# Patient Record
Sex: MALE | Race: WHITE
[De-identification: names, ages, dates, MRNs, and addresses within clinical notes are randomized per-mention and may not be internally consistent; named-entity substitution may affect disease eponyms.]

---

## 2017-05-11 ENCOUNTER — HOSPITAL ENCOUNTER (OUTPATIENT)
Dept: HOSPITAL 76 - DI | Age: 82
Discharge: HOME | End: 2017-05-11
Attending: FAMILY MEDICINE
Payer: MEDICARE

## 2017-05-11 DIAGNOSIS — R91.8: Primary | ICD-10-CM

## 2017-05-11 PROCEDURE — 71250 CT THORAX DX C-: CPT

## 2017-05-11 NOTE — CT REPORT
CT CHEST WITHOUT CONTRAST:  05/11/2017

 

CLINICAL INDICATION:  Possible new mediastinal mass on chest x-ray of 05/04/
2017.

 

TECHNIQUE:  Axial CT images of the chest were obtained without contrast.

 

In accordance with CT protocol optimization, one or more of the following dose 
reduction techniques were utilized for this exam:  automated exposure control, 
adjustment of mA and/or KV based on patient size, or use of iterative 
reconstructive technique.

 

COMPARISON:  Chest x-ray 05/04/2017. 

 

FINDINGS:  The heart and great vessels demonstrate atherosclerotic 
calcification.  No hilar or mediastinal lymphadenopathy is appreciated.  The 
abnormality on plain film correlates with prominent right hilar vasculature.  
No parenchymal mass is seen in the lung.  There are several small noncalcified 
pulmonary nodules present, predominantly subpleural in location, measuring up 
to 6 mm in diameter.  No effusion or pneumothorax is present.  Limited 
evaluation of upper abdominal structures demonstrates normal adrenal glands.  
Multiple renal cysts are noted.  Osseous structures demonstrate degenerative 
changes.

 

IMPRESSION:  

1.  PLAIN FILM ABNORMALITY CORRELATES WITH PROMINENT RIGHT HILAR VASCULAR 
STRUCTURES.  NO HILAR ADENOPATHY OR PARENCHYMAL LUNG MASS IS IDENTIFIED.

 

2.  MULTIPLE SMALL NONCALCIFIED SUBPLEURAL NODULES BILATERALLY, MEASURING UP TO 
6 MM IN DIAMETER.  PER [employer] GUIDELINES, IF THE PATIENT IS LOW 
RISK FOR LUNG CANCER, FOLLOWUP SCAN IN 12 MONTHS IS RECOMMENDED; IF THE PATIENT 
IS HIGH RISK, FOLLOWUP IN 6-12, THEN 18-24 MONTHS WOULD BE RECOMMENDED. 

 

 

 

DD:05/11/2017 12:05:41  DT: 05/11/2017 12:12  JOB #: [id number]  EXT JOB #:
[id number]

[email or web address]

## 2017-11-21 ENCOUNTER — HOSPITAL ENCOUNTER (OUTPATIENT)
Dept: HOSPITAL 76 - DI | Age: 82
Discharge: HOME | End: 2017-11-21
Attending: PSYCHIATRY & NEUROLOGY
Payer: MEDICARE

## 2017-11-21 DIAGNOSIS — G20: Primary | ICD-10-CM

## 2017-11-21 PROCEDURE — 70551 MRI BRAIN STEM W/O DYE: CPT

## 2017-11-21 NOTE — MRI REPORT
EXAM:

MRI BRAIN WITHOUT CONTRAST

 

EXAM DATE: 11/21/2017 10:55 AM.

 

CLINICAL HISTORY: PARKINSON DISEASE.

 

COMPARISON: None.

 

TECHNIQUE: Multiplanar, multisequence T1-weighted and fluid-sensitive MR sequences of the brain were 
performed. Sequences optimized for routine evaluation. Other: None. IV Contrast: None.

 

FINDINGS:

The study is somewhat limited due to suboptimal coil being used, as well as patient motion..

 

Brain Volume: Moderate diffuse cerebral and cerebellar volume loss with ex vacuo dilatation of the ve
ntricles and sulci, slightly advanced for age.

 

Parenchyma/Dura: No mass, acute infarct or hemorrhage. Scattered T2/FLAIR hyperintense periventricula
r, deep, and subcortical white matter lesions in cerebral hemispheres bilaterally. No parenchymal earlene
rohemorrhages.

 

Ventricles/Cisterns: Moderate ex vacuo dilatation. 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

 

Orbits: Status post bilateral lens replacement surgery. The visualized orbits are otherwise unremarka
ble.

 

Sella Turcica: The pituitary gland, cavernous sinuses, suprasellar cistern and optic chiasm are unrem
arkable.

 

IAC: Symmetric and unremarkable.

 

Vasculature: Normal signal flow void is seen in the major arterial structures at the skull base.

 

Sinuses: Complete opacification of the right maxillary sinus. Moderate mucosal thickening left maxill
zahra sinus.

 

Bones: No focal pathologic appearing marrow signal changes.

 

Other: None.

 

 

IMPRESSION: 

1. No evidence of acute intracranial abnormality, specifically no evidence of acute or subacute infar
ct, intracranial hemorrhage, mass effect, midline shift, or hydrocephalus. The study is somewhat limi
mani due to suboptimal coil being used, as well as patient motion.

 

2. Moderate diffuse cerebral and cerebellar volume loss with ex vacuo dilatation of the ventricles an
d sulci, slightly advanced for age.

 

3. Scattered T2/FLAIR hyperintense periventricular, deep, and subcortical white matter lesions in cer
ebral hemispheres bilaterally. While nonspecific, these are favored to represent sequela of chronic m
icroangiopathy.

 

4. Complete opacification of the right maxillary sinus. Moderate mucosal thickening left maxillary si
nus. Recommend clinical correlation for symptoms of sinusitis.

 

[facility]

Referring Provider Line: [phone]

 

SITE ID: [id number]

## 2018-06-19 ENCOUNTER — HOSPITAL ENCOUNTER (OUTPATIENT)
Dept: HOSPITAL 76 - LAB.WCP | Age: 83
Discharge: HOME | End: 2018-06-19
Attending: FAMILY MEDICINE
Payer: MEDICARE

## 2018-06-19 DIAGNOSIS — I12.9: ICD-10-CM

## 2018-06-19 DIAGNOSIS — R73.01: Primary | ICD-10-CM

## 2018-06-19 DIAGNOSIS — N18.3: ICD-10-CM

## 2018-06-19 DIAGNOSIS — I42.9: ICD-10-CM

## 2018-06-19 DIAGNOSIS — R06.09: ICD-10-CM

## 2018-06-19 DIAGNOSIS — I25.10: ICD-10-CM

## 2018-06-19 LAB
ALBUMIN DIAFP-MCNC: 3.9 G/DL (ref 3.2–5.5)
ALBUMIN/GLOB SERPL: 1.2 {RATIO} (ref 1–2.2)
ALP SERPL-CCNC: 63 IU/L (ref 42–121)
ALT SERPL W P-5'-P-CCNC: < 10 IU/L (ref 10–60)
ANION GAP SERPL CALCULATED.4IONS-SCNC: 10 MMOL/L (ref 6–13)
AST SERPL W P-5'-P-CCNC: 24 IU/L (ref 10–42)
BASOPHILS NFR BLD AUTO: 0 10^3/UL (ref 0–0.1)
BASOPHILS NFR BLD AUTO: 0.5 %
BILIRUB BLD-MCNC: 1.1 MG/DL (ref 0.2–1)
BUN SERPL-MCNC: 31 MG/DL (ref 6–20)
CALCIUM UR-MCNC: 9 MG/DL (ref 8.5–10.3)
CHLORIDE SERPL-SCNC: 105 MMOL/L (ref 101–111)
CHOLEST SERPL-MCNC: 116 MG/DL
CO2 SERPL-SCNC: 23 MMOL/L (ref 21–32)
CREAT SERPLBLD-SCNC: 1 MG/DL (ref 0.6–1.2)
EOSINOPHIL # BLD AUTO: 0.1 10^3/UL (ref 0–0.7)
EOSINOPHIL NFR BLD AUTO: 1.6 %
ERYTHROCYTE [DISTWIDTH] IN BLOOD BY AUTOMATED COUNT: 14.7 % (ref 12–15)
EST. AVERAGE GLUCOSE BLD GHB EST-MCNC: 103 MG/DL (ref 70–100)
GFRSERPLBLD MDRD-ARVRAT: 71 ML/MIN/{1.73_M2} (ref 89–?)
GLOBULIN SER-MCNC: 3.2 G/DL (ref 2.1–4.2)
GLUCOSE SERPL-MCNC: 95 MG/DL (ref 70–100)
HB2 TOTAL: 15.8 G/DL
HBA1C BLD-MCNC: 0.53 G/DL
HDLC SERPL-MCNC: 49 MG/DL
HDLC SERPL: 2.4 {RATIO} (ref ?–5)
HEMOGLOBIN A1C %: 5.2 % (ref 4.6–6.2)
HGB UR QL STRIP: 14.3 G/DL (ref 14–18)
LDLC SERPL CALC-MCNC: 56 MG/DL
LDLC/HDLC SERPL: 1.1 {RATIO} (ref ?–3.6)
LYMPHOCYTES # SPEC AUTO: 1.1 10^3/UL (ref 1.5–3.5)
LYMPHOCYTES NFR BLD AUTO: 18 %
MCH RBC QN AUTO: 32.9 PG (ref 27–31)
MCHC RBC AUTO-ENTMCNC: 34.1 G/DL (ref 32–36)
MCV RBC AUTO: 96.5 FL (ref 80–94)
MONOCYTES # BLD AUTO: 0.4 10^3/UL (ref 0–1)
MONOCYTES NFR BLD AUTO: 7.1 %
NEUTROPHILS # BLD AUTO: 4.4 10^3/UL (ref 1.5–6.6)
NEUTROPHILS # SNV AUTO: 6.1 X10^3/UL (ref 4.8–10.8)
NEUTROPHILS NFR BLD AUTO: 72.8 %
PDW BLD AUTO: 9 FL (ref 7.4–11.4)
PLATELET # BLD: 137 10^3/UL (ref 130–450)
PROT SPEC-MCNC: 7.1 G/DL (ref 6.7–8.2)
RBC MAR: 4.35 10^6/UL (ref 4.7–6.1)
SODIUM SERPLBLD-SCNC: 138 MMOL/L (ref 135–145)
VLDLC SERPL-SCNC: 11 MG/DL

## 2018-06-19 PROCEDURE — 80053 COMPREHEN METABOLIC PANEL: CPT

## 2018-06-19 PROCEDURE — 83880 ASSAY OF NATRIURETIC PEPTIDE: CPT

## 2018-06-19 PROCEDURE — 83721 ASSAY OF BLOOD LIPOPROTEIN: CPT

## 2018-06-19 PROCEDURE — 84443 ASSAY THYROID STIM HORMONE: CPT

## 2018-06-19 PROCEDURE — 83036 HEMOGLOBIN GLYCOSYLATED A1C: CPT

## 2018-06-19 PROCEDURE — 85025 COMPLETE CBC W/AUTO DIFF WBC: CPT

## 2018-06-19 PROCEDURE — 36415 COLL VENOUS BLD VENIPUNCTURE: CPT

## 2018-06-19 PROCEDURE — 80061 LIPID PANEL: CPT

## 2018-10-29 ENCOUNTER — HOSPITAL ENCOUNTER (OUTPATIENT)
Dept: HOSPITAL 76 - SC | Age: 83
Discharge: HOME | End: 2018-10-29
Attending: INTERNAL MEDICINE
Payer: MEDICARE

## 2018-10-29 DIAGNOSIS — G47.10: Primary | ICD-10-CM

## 2018-10-29 PROCEDURE — 99212 OFFICE O/P EST SF 10 MIN: CPT

## 2018-10-29 PROCEDURE — 99203 OFFICE O/P NEW LOW 30 MIN: CPT

## 2019-02-19 ENCOUNTER — HOSPITAL ENCOUNTER (OUTPATIENT)
Dept: HOSPITAL 76 - DI | Age: 84
Discharge: HOME | End: 2019-02-19
Attending: PODIATRIST
Payer: MEDICARE

## 2019-02-19 ENCOUNTER — HOSPITAL ENCOUNTER (OUTPATIENT)
Dept: HOSPITAL 76 - LAB.WCP | Age: 84
Discharge: HOME | End: 2019-02-19
Attending: FAMILY MEDICINE
Payer: MEDICARE

## 2019-02-19 DIAGNOSIS — I25.10: ICD-10-CM

## 2019-02-19 DIAGNOSIS — M77.32: ICD-10-CM

## 2019-02-19 DIAGNOSIS — N18.3: Primary | ICD-10-CM

## 2019-02-19 DIAGNOSIS — M19.072: Primary | ICD-10-CM

## 2019-02-19 DIAGNOSIS — R41.82: ICD-10-CM

## 2019-02-19 DIAGNOSIS — G20: ICD-10-CM

## 2019-02-19 DIAGNOSIS — N18.3: ICD-10-CM

## 2019-02-19 LAB
ALBUMIN DIAFP-MCNC: 4.1 G/DL (ref 3.2–5.5)
ALBUMIN/GLOB SERPL: 1.4 {RATIO} (ref 1–2.2)
ALP SERPL-CCNC: 76 IU/L (ref 42–121)
ALT SERPL W P-5'-P-CCNC: < 10 IU/L (ref 10–60)
ANION GAP SERPL CALCULATED.4IONS-SCNC: 9 MMOL/L (ref 6–13)
AST SERPL W P-5'-P-CCNC: 27 IU/L (ref 10–42)
BASOPHILS NFR BLD AUTO: 0.5 10^3/UL (ref 0–0.1)
BASOPHILS NFR BLD AUTO: 7 %
BILIRUB BLD-MCNC: 1.2 MG/DL (ref 0.2–1)
BUN SERPL-MCNC: 29 MG/DL (ref 6–20)
CALCIUM UR-MCNC: 8.9 MG/DL (ref 8.5–10.3)
CHLORIDE SERPL-SCNC: 100 MMOL/L (ref 101–111)
CHOLEST SERPL-MCNC: 111 MG/DL
CO2 SERPL-SCNC: 25 MMOL/L (ref 21–32)
CREAT SERPLBLD-SCNC: 1.1 MG/DL (ref 0.6–1.2)
EOSINOPHIL # BLD AUTO: 0.2 10^3/UL (ref 0–0.7)
EOSINOPHIL NFR BLD AUTO: 2.5 %
ERYTHROCYTE [DISTWIDTH] IN BLOOD BY AUTOMATED COUNT: 16.4 % (ref 12–15)
GFRSERPLBLD MDRD-ARVRAT: 64 ML/MIN/{1.73_M2} (ref 89–?)
GLOBULIN SER-MCNC: 3 G/DL (ref 2.1–4.2)
GLUCOSE SERPL-MCNC: 91 MG/DL (ref 70–100)
HDLC SERPL-MCNC: 48 MG/DL
HDLC SERPL: 2.3 {RATIO} (ref ?–5)
HGB UR QL STRIP: 14.3 G/DL (ref 14–18)
LDLC SERPL CALC-MCNC: 53 MG/DL
LDLC/HDLC SERPL: 1.1 {RATIO} (ref ?–3.6)
LYMPHOCYTES # SPEC AUTO: 0.4 10^3/UL (ref 1.5–3.5)
LYMPHOCYTES NFR BLD AUTO: 5.5 %
MCH RBC QN AUTO: 32.6 PG (ref 27–31)
MCHC RBC AUTO-ENTMCNC: 30.8 G/DL (ref 32–36)
MCV RBC AUTO: 105.8 FL (ref 80–94)
MONOCYTES # BLD AUTO: 0.5 10^3/UL (ref 0–1)
MONOCYTES NFR BLD AUTO: 7.6 %
NEUTROPHILS # BLD AUTO: 5.1 10^3/UL (ref 1.5–6.6)
NEUTROPHILS # SNV AUTO: 6.6 X10^3/UL (ref 4.8–10.8)
NEUTROPHILS NFR BLD AUTO: 77.4 %
PDW BLD AUTO: 10.2 FL (ref 7.4–11.4)
PLATELET # BLD: 133 10^3/UL (ref 130–450)
PROT SPEC-MCNC: 7.1 G/DL (ref 6.7–8.2)
RBC MAR: 4.39 10^6/UL (ref 4.7–6.1)
SODIUM SERPLBLD-SCNC: 134 MMOL/L (ref 135–145)
TSH SERPL-ACNC: 2.3 UIU/ML (ref 0.34–5.6)
VIT B12 SERPL-MCNC: 369 PG/ML (ref 180–914)
VLDLC SERPL-SCNC: 10 MG/DL

## 2019-02-19 PROCEDURE — 85025 COMPLETE CBC W/AUTO DIFF WBC: CPT

## 2019-02-19 PROCEDURE — 82607 VITAMIN B-12: CPT

## 2019-02-19 PROCEDURE — 83721 ASSAY OF BLOOD LIPOPROTEIN: CPT

## 2019-02-19 PROCEDURE — 80061 LIPID PANEL: CPT

## 2019-02-19 PROCEDURE — 80053 COMPREHEN METABOLIC PANEL: CPT

## 2019-02-19 PROCEDURE — 36415 COLL VENOUS BLD VENIPUNCTURE: CPT

## 2019-02-19 PROCEDURE — 84443 ASSAY THYROID STIM HORMONE: CPT

## 2019-02-19 NOTE — XRAY REPORT
Reason:  L FOOT PAIN X 2 YRS

Procedure Date:  02/19/2019   

Accession Number:  [id number] / [id number]                    

Procedure:  XR  - Foot 3 View LT CPT Code:  

 

FULL RESULT:

 

 

EXAM:

LEFT FOOT RADIOGRAPHY

 

EXAM DATE: 2/19/2019 10:55 AM.

 

CLINICAL HISTORY: L FOOT PAIN X 2 YRS.

 

COMPARISON: None.

 

TECHNIQUE: 3 views.

 

FINDINGS: PIPs in flexion

Bones: Plantar heel spur. Achilles tendon heel spur No fractures or bone 

lesions.

 

Joints: First metatarsophalangeal joint space ossified joint space 

narrowing.

 

Soft Tissues:  Soft tissue swelling.

IMPRESSION:

1. DJD

2. Heel spurs

 

[facility]

## 2019-07-20 ENCOUNTER — HOSPITAL ENCOUNTER (EMERGENCY)
Dept: HOSPITAL 76 - ED | Age: 84
Discharge: HOME | End: 2019-07-20
Payer: MEDICARE

## 2019-07-20 VITALS — DIASTOLIC BLOOD PRESSURE: 91 MMHG | SYSTOLIC BLOOD PRESSURE: 194 MMHG
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
## 2019-07-20 DIAGNOSIS — S22.41XA: Primary | ICD-10-CM

## 2019-07-20 DIAGNOSIS — I10: ICD-10-CM
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
## 2019-07-20 DIAGNOSIS — G20: ICD-10-CM

## 2019-07-20 PROCEDURE — 99283 EMERGENCY DEPT VISIT LOW MDM: CPT

## 2019-07-20 PROCEDURE — 71101 X-RAY EXAM UNILAT RIBS/CHEST: CPT

## 2019-07-20 PROCEDURE — 99284 EMERGENCY DEPT VISIT MOD MDM: CPT

## 2019-07-20 NOTE — ED PHYSICIAN DOCUMENTATION
History of Present Illness





- Stated complaint


Stated Complaint: GLF/RT SIDE RIB PX





- Chief complaint


Chief Complaint: General





- History obtained from


History obtained from: Patient, Family





- History of Present Illness


Timing: Prior to arrival





- Additonal information


Additional information: 





Patient is an 84-year-old male with history of Parkinson's, hyperlipidemia, 

hypertension with only anticoagulation of baby aspirin presenting with his wife 

after mechanical fall just prior to arrival.  Wife reports that she had the 

patient will try to lift something into the back of the car when he miss stepped

and fell forward onto his knees.  Patient was able to catch himself on the 

bumper of the car.  He did not strike his head or lose consciousness.  Patient 

does report superficial abrasions to his right knee without other extremity 

pain, sensation change, strength or range of motion change.  Patient also denies

neck pain, back pain, headache, chest pain except for rib pain, abdominal pain, 

nausea, vomiting, urinary or stool changes.  Patient does report right-sided rib

pain that hurts more with movement and breathing.  No other improving or 

worsening factors noted. 





Review of Systems


Cardiac: reports: Other (Rib pain)


Respiratory: reports: Dyspnea


GI: denies: Abdominal Pain, Nausea, Vomiting, Constipation, Diarrhea


: denies: Dysuria


Skin: reports: Abrasion (s)


Musculoskeletal: denies: Neck pain, Back pain





PD PAST MEDICAL HISTORY





- Past Medical History


Cardiovascular: Hypertension, High cholesterol, Coronary artery disease, MI


Respiratory: Asthma, COPD


Endocrine/Autoimmune: None


GI: GERD, Colon polyps


: Frequency, Kidney stones, Other


HEENT: Glaucoma, Chronic sinusitis


Psych: None


Musculoskeletal: None


Derm: Psoriasis





- Past Surgical History


Past Surgical History: Yes


General: Colonoscopy


Cardiovascular: Coronary stent


HEENT: Cataracts, Other





- Present Medications


Home Medications: 


                                Ambulatory Orders











 Medication  Instructions  Recorded  Confirmed


 


Aspirin 81 mg PO DAILY 12/18/13 02/22/16


 


Fluticasone/Salmeterol [Advair 1 puffs INH BID 12/18/13 02/22/16





500-50 Diskus]   


 


Travoprost [Travatan Z] 5 ml OP QPM 12/18/13 02/22/16


 


Docusate Sodium [Stool Softener] 250 mg PO DAILY 02/22/16 02/22/16


 


Hydrochlorothiazide 25 mg PO DAILY 02/22/16 02/22/16


 


Amiodarone [Pacerone] 200 mg PO DAILY 05/03/16 05/03/16


 


Atorvastatin [Lipitor] 20 mg PO DAILY 05/03/16 05/03/16


 


Clopidogrel [Plavix] 75 05/03/16 


 


Metoprolol Tartrate 6.25 mg PO DAILY 05/03/16 05/03/16


 


Albuterol Sulfate [Proair Hfa 8.5 gm IH QID #1 hfa.aer.ad 06/11/16 





Inhaler]   


 


Furosemide [Lasix] 20 mg 06/11/16 


 


Furosemide [Lasix] 20 mg PO DAILY #60 tablet 06/11/16 


 


dexAMETHasone [Decadron] 4 mg PO DAILY #5 tablet 06/11/16 


 


Diazepam [Valium] 2 mg PO Q6HR #10 tablet 07/20/19 


 


Hydrocodone/Acetaminophen 1 each PO Q6H PRN #14 tablet 07/20/19 





[Hydrocodon-Acetaminophen 5-325]   














- Allergies


Allergies/Adverse Reactions: 


                                    Allergies











Allergy/AdvReac Type Severity Reaction Status Date / Time


 


No Known Drug Allergies Allergy   Verified 07/20/19 18:46














- Social History


Does the pt smoke?: No


Smoking Status: Never smoker


Does the pt drink ETOH?: Yes


Does the pt have substance abuse?: No





- Immunizations


Immunizations are current?: Yes





- POLST


Patient has POLST: No





PD ED PE NORMAL





- Vitals


Vital signs reviewed: Yes





- General


General: Alert and oriented X 3, No acute distress, Well developed/nourished





- HEENT


HEENT: Atraumatic, Moist mucous membranes





- Neck


Neck: No bony TTP





- Cardiac


Cardiac: RRR, No murmur, Other (Diffuse right-sided rib discomfort with 

palpation)





- Respiratory


Respiratory: No respiratory distress, Clear bilaterally





- Abdomen


Abdomen: Normal bowel sounds, Soft, Non tender, Non distended





- Derm


Derm: Normal color, Warm and dry, No rash, Other (Except for superficial 

abrasions over right knee otherwise uncomplicated)





- Extremities


Extremities: No deformity, No tenderness to palpate





- Neuro


Neuro: Alert and oriented X 3, No motor deficit, No sensory deficit





- Psych


Psych: Normal mood, Normal affect





Results





- Vitals


Vitals: 


                               Vital Signs - 24 hr











  07/20/19 07/20/19





  18:44 19:24


 


Temperature 36.8 C 36.4 C L


 


Heart Rate 72 70


 


Respiratory 19 18





Rate  


 


Blood Pressure 192/79 H 192/91 H


 


O2 Saturation 100 98








                                     Oxygen











O2 Source                      Room air

















PD MEDICAL DECISION MAKING





- ED course


Complexity details: reviewed results, re-evaluated patient, considered 

differential, d/w patient, d/w family


ED course: 





Patient presenting with mechanical fall just prior to arrival and significant 

right-sided rib pain.  Patient does have superficial abrasions to lower 

extremities that will not require further treatment.  Patient denies other 

injuries and have low suspicion for closed head injury, concussion, vertebral 

spinal cord injury, other chest trauma, abdominal trauma, or other extremity 

injury.  Further evaluation limited to chest and ribs.  Plain films found 

evidence of rib fractures on the right of third, fifth, sixth, seventh ribs.  No

other pneumothorax, hemothorax or other complications noted.  Discussed results 

and recommendations with patient and wife including use of incentive spirometer,

prescription medications, alternatives to prescription medications, return 

precautions, appropriate follow-up.  Patient wife voiced understanding and are 

comfortable discharge plan.





Departure





- Departure


Disposition: 01 Home, Self Care


Clinical Impression: 


Ribs, multiple fractures


Qualifiers:


 Encounter type: initial encounter Fracture type: closed Laterality: right 

Qualified Code(s): S22.41XA - Multiple fractures of ribs, right side, initial 

encounter for closed fracture





Condition: Good


Instructions:  ED Fx Rib


Follow-Up: 


Teddy Talley MD [Primary Care Provider] - Within 3 Days


Prescriptions: 


Diazepam [Valium] 2 mg PO Q6HR #10 tablet


Hydrocodone/Acetaminophen [Hydrocodon-Acetaminophen 5-325] 1 each PO Q6H PRN #14

tablet


 PRN Reason: pain


Comments: 


Please use incentive spirometer as instructed to avoid pneumonia.  Recommend 

heat application to reduce muscle spasm, as well as stretching and mild activity

to help avoid infection and other complication.  However, please do not perform 

heavy lifting or strenuous exercise to worsen pain.  May use ibuprofen/Tylenol 

for pain control, but if you need something stronger, please take Norco and Christy

ium for pain and muscle spasm, respectively.  If taking these medications do not

combine with Tylenol, alcohol, driving.  If taking Norco regularly, please take 

stool softener or laxative to avoid constipation.  Please follow-up with primary

care physician next to 3 days and return to ED sooner if experience worsening 

symptoms or have other concerns.

## 2019-07-20 NOTE — XRAY REPORT
Reason:  fell forward onto knees with right rib pain

Procedure Date:  07/20/2019   

Accession Number:  [id number] / [id number]                    

Procedure:  XR  - Ribs w/PA Chest RT CPT Code:  

 

FULL RESULT:

 

 

EXAM:

RIGHT RIB RADIOGRAPHY

 

EXAM DATE: 7/20/2019 07:21 PM.

 

CLINICAL HISTORY: Fell forward onto knees, with right rib pain.

 

COMPARISON: None.

 

TECHNIQUE: 1 view of the chest and 4 views of the ribs.

 

FINDINGS:

Bones: Fractures of the third and of the fifth through seventh ribs.

 

Lungs: Right base atelectasis. No pneumothorax.

 

Mediastinum: Heart and mediastinal contours are unremarkable.

 

Other: None.

IMPRESSION: Fractures of the right third and fifth through seventh ribs 

with right base atelectasis.

 

[facility]

## 2019-08-12 ENCOUNTER — HOSPITAL ENCOUNTER (OUTPATIENT)
Dept: HOSPITAL 76 - DI | Age: 84
Discharge: HOME | End: 2019-08-12
Attending: PHYSICIAN ASSISTANT
Payer: MEDICARE

## 2019-08-12 DIAGNOSIS — J20.9: Primary | ICD-10-CM

## 2019-08-12 DIAGNOSIS — S22.41XD: ICD-10-CM

## 2019-08-12 PROCEDURE — 71046 X-RAY EXAM CHEST 2 VIEWS: CPT

## 2019-08-14 NOTE — XRAY REPORT
Reason:  BRONCHITIS,ACUTE

Procedure Date:  08/12/2019   

Accession Number:  [id number] / [id number]                    

Procedure:  XR  - Chest 2 View X-Ray CPT Code:  33376

 

FULL RESULT:

 

 

EXAM:

CHEST RADIOGRAPHY

 

EXAM DATE: 8/12/2019 05:14 PM.

 

CLINICAL HISTORY: Bronchitis, acute.

 

COMPARISON: RIBS W/PA CHEST RT 07/20/2019 7:21 PM

CHEST W/O 05/11/2017 10:26 AM

CHEST 2 VIEW PA/LAT 05/04/2017 4:02 PM.

 

TECHNIQUE: 2 views.

 

FINDINGS:

Lungs/Pleura: Hyperinflation. No consolidation. No vascular congestion. 

No pneumothorax. No pleural effusions.

 

Mediastinum: Heart size upper normal. Aorta is tortuous.

 

Other: Degenerative changes of the thoracic spine. Healing right rib 

fractures noted.

IMPRESSION:

1. Hyperinflation.

2. No acute disease.

3. Healing right rib fractures.

 

[facility]

## 2019-10-04 ENCOUNTER — HOSPITAL ENCOUNTER (OUTPATIENT)
Dept: HOSPITAL 76 - EMS | Age: 84
End: 2019-10-04
Attending: SURGERY
Payer: MEDICARE

## 2019-10-04 DIAGNOSIS — Z03.89: Primary | ICD-10-CM

## 2019-10-05 ENCOUNTER — HOSPITAL ENCOUNTER (EMERGENCY)
Dept: HOSPITAL 76 - ED | Age: 84
Discharge: HOME | End: 2019-10-05
Payer: MEDICARE

## 2019-10-05 ENCOUNTER — HOSPITAL ENCOUNTER (OUTPATIENT)
Dept: HOSPITAL 76 - EMS | Age: 84
Discharge: ANOTHER HOSPITAL | End: 2019-10-05
Attending: SURGERY
Payer: MEDICARE

## 2019-10-05 VITALS — DIASTOLIC BLOOD PRESSURE: 79 MMHG | SYSTOLIC BLOOD PRESSURE: 179 MMHG

## 2019-10-05 DIAGNOSIS — M79.10: Primary | ICD-10-CM

## 2019-10-05 DIAGNOSIS — I25.2: ICD-10-CM

## 2019-10-05 DIAGNOSIS — Z79.02: ICD-10-CM
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
## 2019-10-05 DIAGNOSIS — R26.2: ICD-10-CM

## 2019-10-05 DIAGNOSIS — M54.9: Primary | ICD-10-CM

## 2019-10-05 DIAGNOSIS — M25.551: ICD-10-CM

## 2019-10-05 DIAGNOSIS — Z95.5: ICD-10-CM

## 2019-10-05 DIAGNOSIS — G20: ICD-10-CM
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
## 2019-10-05 DIAGNOSIS — Z79.82: ICD-10-CM

## 2019-10-05 DIAGNOSIS — I25.10: ICD-10-CM

## 2019-10-05 DIAGNOSIS — I10: ICD-10-CM

## 2019-10-05 LAB
ALBUMIN DIAFP-MCNC: 4 G/DL (ref 3.2–5.5)
ALBUMIN/GLOB SERPL: 1.3 {RATIO} (ref 1–2.2)
ALP SERPL-CCNC: 80 IU/L (ref 42–121)
ALT SERPL W P-5'-P-CCNC: 11 IU/L (ref 10–60)
ANION GAP SERPL CALCULATED.4IONS-SCNC: 10 MMOL/L (ref 6–13)
AST SERPL W P-5'-P-CCNC: 35 IU/L (ref 10–42)
BASOPHILS NFR BLD AUTO: 0.1 10^3/UL (ref 0–0.1)
BASOPHILS NFR BLD AUTO: 0.6 %
BILIRUB BLD-MCNC: 1.2 MG/DL (ref 0.2–1)
BUN SERPL-MCNC: 28 MG/DL (ref 6–20)
CALCIUM UR-MCNC: 8.9 MG/DL (ref 8.5–10.3)
CHLORIDE SERPL-SCNC: 106 MMOL/L (ref 101–111)
CLARITY UR REFRACT.AUTO: CLEAR
CO2 SERPL-SCNC: 26 MMOL/L (ref 21–32)
CREAT SERPLBLD-SCNC: 1.2 MG/DL (ref 0.6–1.2)
EOSINOPHIL # BLD AUTO: 0.3 10^3/UL (ref 0–0.7)
EOSINOPHIL NFR BLD AUTO: 3.8 %
ERYTHROCYTE [DISTWIDTH] IN BLOOD BY AUTOMATED COUNT: 13.8 % (ref 12–15)
GFRSERPLBLD MDRD-ARVRAT: 58 ML/MIN/{1.73_M2} (ref 89–?)
GLOBULIN SER-MCNC: 3.2 G/DL (ref 2.1–4.2)
GLUCOSE SERPL-MCNC: 114 MG/DL (ref 70–100)
GLUCOSE UR QL STRIP.AUTO: NEGATIVE MG/DL
HGB UR QL STRIP: 14.2 G/DL (ref 14–18)
INR PPP: 1.2 (ref 0.8–1.2)
KETONES UR QL STRIP.AUTO: NEGATIVE MG/DL
LIPASE SERPL-CCNC: 31 U/L (ref 22–51)
LYMPHOCYTES # SPEC AUTO: 1 10^3/UL (ref 1.5–3.5)
LYMPHOCYTES NFR BLD AUTO: 11.8 %
MCH RBC QN AUTO: 33.6 PG (ref 27–31)
MCHC RBC AUTO-ENTMCNC: 33.3 G/DL (ref 32–36)
MCV RBC AUTO: 100.9 FL (ref 80–94)
MONOCYTES # BLD AUTO: 1 10^3/UL (ref 0–1)
MONOCYTES NFR BLD AUTO: 11.6 %
NEUTROPHILS # BLD AUTO: 5.9 10^3/UL (ref 1.5–6.6)
NEUTROPHILS # SNV AUTO: 8.2 X10^3/UL (ref 4.8–10.8)
NEUTROPHILS NFR BLD AUTO: 71.8 %
NITRITE UR QL STRIP.AUTO: NEGATIVE
PDW BLD AUTO: 10.5 FL (ref 7.4–11.4)
PH UR STRIP.AUTO: 5.5 PH (ref 5–7.5)
PLATELET # BLD: 132 10^3/UL (ref 130–450)
PROT SPEC-MCNC: 7.2 G/DL (ref 6.7–8.2)
PROT UR STRIP.AUTO-MCNC: NEGATIVE MG/DL
PROTHROM ACT/NOR PPP: 13.7 SECS (ref 9.9–12.6)
RBC # UR STRIP.AUTO: NEGATIVE /UL
RBC MAR: 4.22 10^6/UL (ref 4.7–6.1)
SODIUM SERPLBLD-SCNC: 142 MMOL/L (ref 135–145)
SP GR UR STRIP.AUTO: 1.02 (ref 1–1.03)
UROBILINOGEN UR QL STRIP.AUTO: (no result) E.U./DL
UROBILINOGEN UR STRIP.AUTO-MCNC: NEGATIVE MG/DL

## 2019-10-05 PROCEDURE — 81003 URINALYSIS AUTO W/O SCOPE: CPT

## 2019-10-05 PROCEDURE — 80053 COMPREHEN METABOLIC PANEL: CPT

## 2019-10-05 PROCEDURE — 36415 COLL VENOUS BLD VENIPUNCTURE: CPT

## 2019-10-05 PROCEDURE — 99283 EMERGENCY DEPT VISIT LOW MDM: CPT

## 2019-10-05 PROCEDURE — 85610 PROTHROMBIN TIME: CPT

## 2019-10-05 PROCEDURE — 99284 EMERGENCY DEPT VISIT MOD MDM: CPT

## 2019-10-05 PROCEDURE — 81001 URINALYSIS AUTO W/SCOPE: CPT

## 2019-10-05 PROCEDURE — 71045 X-RAY EXAM CHEST 1 VIEW: CPT

## 2019-10-05 PROCEDURE — 87086 URINE CULTURE/COLONY COUNT: CPT

## 2019-10-05 PROCEDURE — 85025 COMPLETE CBC W/AUTO DIFF WBC: CPT

## 2019-10-05 PROCEDURE — 83690 ASSAY OF LIPASE: CPT

## 2019-10-05 NOTE — ED PHYSICIAN DOCUMENTATION
History of Present Illness





- Stated complaint


Stated Complaint: GLF





- Chief complaint


Chief Complaint: General





- Additonal information


Additional information: 





This is an 85-year-old male with a history of coronary artery disease, 

Parkinson's, who presents with some pain and difficulty walking after fall.  

History is provided by patient and his wife who is at bedside.  Patient was 

walking 2 days ago in a parking lot and he tripped or slipped and fell landing 

backwards on his right side.  He states he may have hit his head but not in a 

significant way and he denies loss of consciousness, and denies headache. No 

mental status changes over the past 2 days. His wife was able to transfer him to

bed afterwards, But when she tried to stand him he was unable to stand for more 

than a few seconds.  He was unable to localize exactly where he was having pain.

 He did have rib fractures a couple months ago. His wife thinks his pain may be 

around his right hip.





Review of Systems


Constitutional: denies: Fever


Respiratory: denies: Wheezing


GI: denies: Abdominal Pain, Vomiting


: denies: Dysuria


Skin: denies: Rash


Musculoskeletal: denies: Neck pain


Neurologic: denies: LOC





PD PAST MEDICAL HISTORY





- Past Medical History


Cardiovascular: Hypertension, High cholesterol, Coronary artery disease, MI


Respiratory: Asthma, COPD


Neuro: Parkinson's


Endocrine/Autoimmune: None


GI: GERD, Colon polyps


: Frequency, Kidney stones, Other


HEENT: Glaucoma, Chronic sinusitis


Psych: None


Musculoskeletal: None


Derm: Psoriasis





- Past Surgical History


Past Surgical History: Yes


General: Colonoscopy


Cardiovascular: Coronary stent


HEENT: Cataracts, Other





- Present Medications


Home Medications: 


                                Ambulatory Orders











 Medication  Instructions  Recorded  Confirmed


 


Aspirin 81 mg PO DAILY 12/18/13 02/22/16


 


Fluticasone/Salmeterol [Advair 1 puffs INH BID 12/18/13 02/22/16





500-50 Diskus]   


 


Travoprost [Travatan Z] 5 ml OP QPM 12/18/13 02/22/16


 


Docusate Sodium [Stool Softener] 250 mg PO DAILY 02/22/16 02/22/16


 


Hydrochlorothiazide 25 mg PO DAILY 02/22/16 02/22/16


 


Amiodarone [Pacerone] 200 mg PO DAILY 05/03/16 05/03/16


 


Atorvastatin [Lipitor] 20 mg PO DAILY 05/03/16 05/03/16


 


Clopidogrel [Plavix] 75 05/03/16 


 


Metoprolol Tartrate 6.25 mg PO DAILY 05/03/16 05/03/16


 


Albuterol Sulfate [Proair Hfa 8.5 gm IH QID #1 hfa.aer.ad 06/11/16 





Inhaler]   


 


Furosemide [Lasix] 20 mg 06/11/16 


 


Furosemide [Lasix] 20 mg PO DAILY #60 tablet 06/11/16 


 


dexAMETHasone [Decadron] 4 mg PO DAILY #5 tablet 06/11/16 


 


Diazepam [Valium] 2 mg PO Q6HR #10 tablet 07/20/19 


 


Hydrocodone/Acetaminophen 1 each PO Q6H PRN #14 tablet 07/20/19 





[Hydrocodon-Acetaminophen 5-325]   














- Allergies


Allergies/Adverse Reactions: 


                                    Allergies











Allergy/AdvReac Type Severity Reaction Status Date / Time


 


No Known Drug Allergies Allergy   Verified 07/20/19 18:46














- Social History


Does the pt smoke?: No


Smoking Status: Never smoker


Does the pt drink ETOH?: Yes


Does the pt have substance abuse?: No





- Immunizations


Immunizations are current?: Yes





- POLST


Patient has POLST: No





PD ED PE NORMAL





- Vitals


Vital signs reviewed: Yes





- General


General: No acute distress, Well developed/nourished





- HEENT


HEENT: Atraumatic, PERRL





- Neck


Neck: Supple, no meningeal sign





- Cardiac


Cardiac: RRR





- Respiratory


Respiratory: No respiratory distress, Clear bilaterally





- Abdomen


Abdomen: Soft, Non tender, Non distended





- Back


Back: No spinal TTP





- Derm


Derm: Warm and dry





- Extremities


Extremities: No deformity, Other (Normal passive range of motion of the hips, 

knees, ankles.  Patient has some tenderness palpation of the greater trochanter 

on the right.  Neurovascularly intact bilaterally.)





- Neuro


Neuro: CNs 2-12 intact, No motor deficit, No sensory deficit, Other (Alert, 

oriented to self, place, general event. Shuffled gait consistent with 

parkinsons.)





- Psych


Psych: Normal mood, Normal affect





Results





- Vitals


Vitals: 


                               Vital Signs - 24 hr











  10/05/19 10/05/19 10/05/19





  16:19 16:24 18:15


 


Temperature 36.4 C L  


 


Heart Rate 76 79 77


 


Respiratory 18 18 18





Rate   


 


Blood Pressure 185/87 H 180/85 H 160/76 H


 


O2 Saturation 98 98 96














  10/05/19





  19:36


 


Temperature 


 


Heart Rate 77


 


Respiratory 16





Rate 


 


Blood Pressure 179/79 H


 


O2 Saturation 97








                                     Oxygen











O2 Source                      Room air

















- Labs


Labs: 


                                Laboratory Tests











  10/05/19 10/05/19 10/05/19





  16:55 17:05 17:05


 


WBC   8.2 


 


RBC   4.22 L 


 


Hgb   14.2 


 


Hct   42.6 


 


MCV   100.9 H 


 


MCH   33.6 H 


 


MCHC   33.3 


 


RDW   13.8 


 


Plt Count   132 


 


MPV   10.5 


 


Neut # (Auto)   5.9 


 


Lymph # (Auto)   1.0 L 


 


Mono # (Auto)   1.0 


 


Eos # (Auto)   0.3 


 


Baso # (Auto)   0.1 


 


Absolute Nucleated RBC   0.00 


 


Nucleated RBC %   0.0 


 


PT    13.7 H


 


INR    1.2


 


Sodium   


 


Potassium   


 


Chloride   


 


Carbon Dioxide   


 


Anion Gap   


 


BUN   


 


Creatinine   


 


Estimated GFR (MDRD)   


 


Glucose   


 


Calcium   


 


Total Bilirubin   


 


AST   


 


ALT   


 


Alkaline Phosphatase   


 


Total Protein   


 


Albumin   


 


Globulin   


 


Albumin/Globulin Ratio   


 


Lipase   


 


Urine Color  YELLOW  


 


Urine Clarity  CLEAR  


 


Urine pH  5.5  


 


Ur Specific Gravity  1.020  


 


Urine Protein  NEGATIVE  


 


Urine Glucose (UA)  NEGATIVE  


 


Urine Ketones  NEGATIVE  


 


Urine Occult Blood  NEGATIVE  


 


Urine Nitrite  NEGATIVE  


 


Urine Bilirubin  NEGATIVE  


 


Urine Urobilinogen  0.2 (NORMAL)  


 


Ur Leukocyte Esterase  NEGATIVE  


 


Ur Microscopic Review  NOT INDICATED  


 


Urine Culture Comments  NOT INDICATED  














  10/05/19





  17:05


 


WBC 


 


RBC 


 


Hgb 


 


Hct 


 


MCV 


 


MCH 


 


MCHC 


 


RDW 


 


Plt Count 


 


MPV 


 


Neut # (Auto) 


 


Lymph # (Auto) 


 


Mono # (Auto) 


 


Eos # (Auto) 


 


Baso # (Auto) 


 


Absolute Nucleated RBC 


 


Nucleated RBC % 


 


PT 


 


INR 


 


Sodium  142


 


Potassium  4.2


 


Chloride  106


 


Carbon Dioxide  26


 


Anion Gap  10.0


 


BUN  28 H


 


Creatinine  1.2


 


Estimated GFR (MDRD)  58 L


 


Glucose  114 H


 


Calcium  8.9


 


Total Bilirubin  1.2 H


 


AST  35


 


ALT  11


 


Alkaline Phosphatase  80


 


Total Protein  7.2


 


Albumin  4.0


 


Globulin  3.2


 


Albumin/Globulin Ratio  1.3


 


Lipase  31


 


Urine Color 


 


Urine Clarity 


 


Urine pH 


 


Ur Specific Gravity 


 


Urine Protein 


 


Urine Glucose (UA) 


 


Urine Ketones 


 


Urine Occult Blood 


 


Urine Nitrite 


 


Urine Bilirubin 


 


Urine Urobilinogen 


 


Ur Leukocyte Esterase 


 


Ur Microscopic Review 


 


Urine Culture Comments 














- Rads (name of study)


  ** CXR


Radiology: Other (Mild developing central congestive changes without other acute

cardiopulmonary abnormality.)





  ** R Hip and pelvis


Radiology: Other (No fracture or bony abnormality)





PD MEDICAL DECISION MAKING





- ED course


Complexity details: considered differential (Fracture, dislocation, contusion, 

strain, sprain, UTI)


ED course: 





Pt presents with non-localizing discomfort and reduced mobility after a fall. A 

thorough exam reveals questionable mild tenderness over the right great 

trochanter, with full ROM of the bilateral hips and no other obvious tenderness.

XR of the R hip, pelvis, and chest are unremarkable. He has no signs of head 

trauma, no LOC, no neck or back tenderness or signs of trauma that would prompt 

imaging. His mental status is at his baseline. Patient was able to stand and 

walk with a shuffling gait, which his family states is near his baseline. While 

standing he stated that he had some discomfort but again was unable to localize 

where, and said it was not in his hips, back, or chest.





Labs unremarkable, negative UA.





I discussed that he may have diffuse muscle soreness, or possibly an occult 

fracture, though given his good ROM and ability to walk I doubt pelvis/hip 

fracture at this time. His family feels comfortable taking him home and if he 

worsens, develops any focal pain, or does not have continued improvement she 

will bring him back for re-evaluation. 





Departure





- Departure


Disposition: 01 Home, Self Care


Clinical Impression: 


 Generalized muscle ache





Condition: Good


Follow-Up: 


Teddy Talley MD [Primary Care Provider] - Within 3 Days (Call for follow

up on reduced mobility after fall)


Comments: 


Lake was seen today because he has had some difficulty ambulating/moving since

a fall.  We did not see obvious signs of a fracture and he is able to stand and 

take some steps without any pain, which is reassuring.  If he develops new pain,

or is able to localize discomfort in one region, please bring him back to the 

emergency department for reevaluation.  Please also schedule a follow-up appoint

with his primary care provider soon as possible.  If he has any other new or 

concerning symptoms please return to the ED


Discharge Date/Time: 10/05/19 19:36

## 2019-10-05 NOTE — XRAY REPORT
Reason:  chest pain

Procedure Date:  10/05/2019   

Accession Number:  [id number] / [id number]                    

Procedure:  XR  - Chest 1 View X-Ray CPT Code:  72158

 

FULL RESULT:

 

 

EXAM:

CHEST RADIOGRAPHY

 

EXAM DATE: 10/5/2019 05:06 PM.

 

CLINICAL HISTORY: Chest pain.

 

COMPARISON: CHEST 2 VIEW 08/12/2019 4:57 PM.

 

TECHNIQUE: 1 view.

 

FINDINGS:

Lungs/Pleura: Developing mild central congestive changes. No gross 

consolidation noted.

 

Mediastinum: Within exam limitations, the cardiomediastinal contour is 

normal.

 

Other: Right-sided rib fracture deformities present.

 

IMPRESSION: Developing mild central congestive changes without gross 

consolidation.

 

[facility]

## 2019-10-05 NOTE — XRAY REPORT
Reason:  R hip pain after fall

Procedure Date:  10/05/2019   

Accession Number:  [id number] / [id number]                    

Procedure:  XR  - Hip w/Pelvis 2-3V RT CPT Code:  

 

FULL RESULT:

 

 

EXAM:

RIGHT HIP RADIOGRAPHY

 

EXAM DATE: 10/5/2019 04:54 PM.

 

CLINICAL HISTORY: Right hip pain

 

COMPARISON: None.

 

TECHNIQUE: 3 views.

 

FINDINGS:

Bones:  No fracture or focal bony lesion.

 

Joints:  No evidence of dislocation.

 

Soft Tissues:  No unexpected soft tissue findings.

IMPRESSION:  No evidence of fracture or dislocation. Plain film can be 

insensitive for detection of hip fracture in elderly patients. If there 

is concern for occult proximal femur fracture, MRI is recommended for 

further evaluation.

 

RADIA

## 2020-07-22 ENCOUNTER — HOSPITAL ENCOUNTER (OUTPATIENT)
Dept: HOSPITAL 76 - LAB.WCP | Age: 85
Discharge: HOME | End: 2020-07-22
Attending: FAMILY MEDICINE
Payer: MEDICARE

## 2020-07-22 DIAGNOSIS — I25.10: ICD-10-CM

## 2020-07-22 DIAGNOSIS — G20: Primary | ICD-10-CM

## 2020-07-22 DIAGNOSIS — N18.3: ICD-10-CM

## 2020-07-22 DIAGNOSIS — I12.9: ICD-10-CM

## 2020-07-22 LAB
ALBUMIN DIAFP-MCNC: 4.4 G/DL (ref 3.2–5.5)
ALBUMIN/GLOB SERPL: 1.4 {RATIO} (ref 1–2.2)
ALP SERPL-CCNC: 87 IU/L (ref 42–121)
ALT SERPL W P-5'-P-CCNC: < 10 IU/L (ref 10–60)
ANION GAP SERPL CALCULATED.4IONS-SCNC: 8 MMOL/L (ref 6–13)
AST SERPL W P-5'-P-CCNC: 23 IU/L (ref 10–42)
BASOPHILS NFR BLD AUTO: 0 10^3/UL (ref 0–0.1)
BASOPHILS NFR BLD AUTO: 0.4 %
BILIRUB BLD-MCNC: 1.3 MG/DL (ref 0.2–1)
BUN SERPL-MCNC: 27 MG/DL (ref 6–20)
CALCIUM UR-MCNC: 9.2 MG/DL (ref 8.5–10.3)
CHLORIDE SERPL-SCNC: 107 MMOL/L (ref 101–111)
CHOLEST SERPL-MCNC: 108 MG/DL
CO2 SERPL-SCNC: 25 MMOL/L (ref 21–32)
CREAT SERPLBLD-SCNC: 1.2 MG/DL (ref 0.6–1.2)
EOSINOPHIL # BLD AUTO: 0.2 10^3/UL (ref 0–0.7)
EOSINOPHIL NFR BLD AUTO: 2.2 %
ERYTHROCYTE [DISTWIDTH] IN BLOOD BY AUTOMATED COUNT: 13.5 % (ref 12–15)
GLOBULIN SER-MCNC: 3.1 G/DL (ref 2.1–4.2)
GLUCOSE SERPL-MCNC: 100 MG/DL (ref 70–100)
HDLC SERPL-MCNC: 52 MG/DL
HDLC SERPL: 2.1 {RATIO} (ref ?–5)
HGB UR QL STRIP: 14.8 G/DL (ref 14–18)
LDLC SERPL CALC-MCNC: 42 MG/DL
LDLC/HDLC SERPL: 0.8 {RATIO} (ref ?–3.6)
LYMPHOCYTES # SPEC AUTO: 1.2 10^3/UL (ref 1.5–3.5)
LYMPHOCYTES NFR BLD AUTO: 16.2 %
MCH RBC QN AUTO: 33.2 PG (ref 27–31)
MCHC RBC AUTO-ENTMCNC: 32.2 G/DL (ref 32–36)
MCV RBC AUTO: 102.9 FL (ref 80–94)
MONOCYTES # BLD AUTO: 0.6 10^3/UL (ref 0–1)
MONOCYTES NFR BLD AUTO: 7.7 %
NEUTROPHILS # BLD AUTO: 5.3 10^3/UL (ref 1.5–6.6)
NEUTROPHILS # SNV AUTO: 7.3 X10^3/UL (ref 4.8–10.8)
NEUTROPHILS NFR BLD AUTO: 72.9 %
PDW BLD AUTO: 11.7 FL (ref 7.4–11.4)
PLATELET # BLD: 160 10^3/UL (ref 130–450)
PROT SPEC-MCNC: 7.5 G/DL (ref 6.7–8.2)
RBC MAR: 4.46 10^6/UL (ref 4.7–6.1)
SODIUM SERPLBLD-SCNC: 140 MMOL/L (ref 135–145)
VLDLC SERPL-SCNC: 14 MG/DL

## 2020-07-22 PROCEDURE — 83721 ASSAY OF BLOOD LIPOPROTEIN: CPT

## 2020-07-22 PROCEDURE — 80053 COMPREHEN METABOLIC PANEL: CPT

## 2020-07-22 PROCEDURE — 84443 ASSAY THYROID STIM HORMONE: CPT

## 2020-07-22 PROCEDURE — 85025 COMPLETE CBC W/AUTO DIFF WBC: CPT

## 2020-07-22 PROCEDURE — 80061 LIPID PANEL: CPT

## 2020-07-22 PROCEDURE — 36415 COLL VENOUS BLD VENIPUNCTURE: CPT

## 2020-08-06 ENCOUNTER — HOSPITAL ENCOUNTER (INPATIENT)
Dept: HOSPITAL 76 - ED | Age: 85
LOS: 2 days | Discharge: HOME HEALTH | DRG: 683 | End: 2020-08-08
Attending: INTERNAL MEDICINE | Admitting: INTERNAL MEDICINE
Payer: MEDICARE

## 2020-08-06 DIAGNOSIS — I11.0: ICD-10-CM

## 2020-08-06 DIAGNOSIS — Z87.891: ICD-10-CM

## 2020-08-06 DIAGNOSIS — J32.9: ICD-10-CM

## 2020-08-06 DIAGNOSIS — N17.9: Primary | ICD-10-CM

## 2020-08-06 DIAGNOSIS — E86.0: ICD-10-CM

## 2020-08-06 DIAGNOSIS — H40.9: ICD-10-CM

## 2020-08-06 DIAGNOSIS — G20: ICD-10-CM

## 2020-08-06 DIAGNOSIS — Z79.51: ICD-10-CM

## 2020-08-06 DIAGNOSIS — Z87.442: ICD-10-CM

## 2020-08-06 DIAGNOSIS — I50.32: ICD-10-CM

## 2020-08-06 DIAGNOSIS — J44.9: ICD-10-CM

## 2020-08-06 DIAGNOSIS — L40.9: ICD-10-CM

## 2020-08-06 DIAGNOSIS — Z79.82: ICD-10-CM

## 2020-08-06 DIAGNOSIS — I25.10: ICD-10-CM

## 2020-08-06 DIAGNOSIS — Z95.5: ICD-10-CM

## 2020-08-06 DIAGNOSIS — E78.00: ICD-10-CM

## 2020-08-06 DIAGNOSIS — Z20.828: ICD-10-CM

## 2020-08-06 DIAGNOSIS — F02.80: ICD-10-CM

## 2020-08-06 DIAGNOSIS — I25.2: ICD-10-CM

## 2020-08-06 DIAGNOSIS — K21.9: ICD-10-CM

## 2020-08-06 DIAGNOSIS — Z66: ICD-10-CM

## 2020-08-06 DIAGNOSIS — R35.0: ICD-10-CM

## 2020-08-06 LAB
ALBUMIN DIAFP-MCNC: 3.7 G/DL (ref 3.2–5.5)
ALBUMIN/GLOB SERPL: 1 {RATIO} (ref 1–2.2)
ALP SERPL-CCNC: 83 IU/L (ref 42–121)
ALT SERPL W P-5'-P-CCNC: 13 IU/L (ref 10–60)
ANION GAP SERPL CALCULATED.4IONS-SCNC: 7 MMOL/L (ref 6–13)
AST SERPL W P-5'-P-CCNC: 52 IU/L (ref 10–42)
BASOPHILS NFR BLD AUTO: 0 10^3/UL (ref 0–0.1)
BASOPHILS NFR BLD AUTO: 0.4 %
BILIRUB BLD-MCNC: 1.2 MG/DL (ref 0.2–1)
BUN SERPL-MCNC: 41 MG/DL (ref 6–20)
CALCIUM UR-MCNC: 9 MG/DL (ref 8.5–10.3)
CHLORIDE SERPL-SCNC: 108 MMOL/L (ref 101–111)
CLARITY UR REFRACT.AUTO: CLEAR
CO2 SERPL-SCNC: 25 MMOL/L (ref 21–32)
CREAT SERPLBLD-SCNC: 2.5 MG/DL (ref 0.6–1.2)
EOSINOPHIL # BLD AUTO: 0.1 10^3/UL (ref 0–0.7)
EOSINOPHIL NFR BLD AUTO: 1.3 %
ERYTHROCYTE [DISTWIDTH] IN BLOOD BY AUTOMATED COUNT: 13.3 % (ref 12–15)
GLOBULIN SER-MCNC: 3.8 G/DL (ref 2.1–4.2)
GLUCOSE SERPL-MCNC: 118 MG/DL (ref 70–100)
GLUCOSE UR QL STRIP.AUTO: NEGATIVE MG/DL
HGB UR QL STRIP: 14 G/DL (ref 14–18)
KETONES UR QL STRIP.AUTO: NEGATIVE MG/DL
LIPASE SERPL-CCNC: 36 U/L (ref 22–51)
LYMPHOCYTES # SPEC AUTO: 0.8 10^3/UL (ref 1.5–3.5)
LYMPHOCYTES NFR BLD AUTO: 7.8 %
MCH RBC QN AUTO: 33.5 PG (ref 27–31)
MCHC RBC AUTO-ENTMCNC: 32.2 G/DL (ref 32–36)
MCV RBC AUTO: 104.1 FL (ref 80–94)
MONOCYTES # BLD AUTO: 1 10^3/UL (ref 0–1)
MONOCYTES NFR BLD AUTO: 9.8 %
NEUTROPHILS # BLD AUTO: 7.7 10^3/UL (ref 1.5–6.6)
NEUTROPHILS # SNV AUTO: 9.7 X10^3/UL (ref 4.8–10.8)
NEUTROPHILS NFR BLD AUTO: 80.2 %
NITRITE UR QL STRIP.AUTO: NEGATIVE
PDW BLD AUTO: 10.4 FL (ref 7.4–11.4)
PH UR STRIP.AUTO: 5.5 PH (ref 5–7.5)
PLATELET # BLD: 157 10^3/UL (ref 130–450)
PROT SPEC-MCNC: 7.5 G/DL (ref 6.7–8.2)
PROT UR STRIP.AUTO-MCNC: NEGATIVE MG/DL
RBC # UR STRIP.AUTO: NEGATIVE /UL
RBC MAR: 4.18 10^6/UL (ref 4.7–6.1)
SODIUM SERPLBLD-SCNC: 140 MMOL/L (ref 135–145)
SP GR UR STRIP.AUTO: 1.01 (ref 1–1.03)
UROBILINOGEN UR QL STRIP.AUTO: (no result) E.U./DL
UROBILINOGEN UR STRIP.AUTO-MCNC: NEGATIVE MG/DL

## 2020-08-06 PROCEDURE — 84484 ASSAY OF TROPONIN QUANT: CPT

## 2020-08-06 PROCEDURE — 83605 ASSAY OF LACTIC ACID: CPT

## 2020-08-06 PROCEDURE — 81001 URINALYSIS AUTO W/SCOPE: CPT

## 2020-08-06 PROCEDURE — 81003 URINALYSIS AUTO W/O SCOPE: CPT

## 2020-08-06 PROCEDURE — 87086 URINE CULTURE/COLONY COUNT: CPT

## 2020-08-06 PROCEDURE — 85025 COMPLETE CBC W/AUTO DIFF WBC: CPT

## 2020-08-06 PROCEDURE — 82550 ASSAY OF CK (CPK): CPT

## 2020-08-06 PROCEDURE — 36415 COLL VENOUS BLD VENIPUNCTURE: CPT

## 2020-08-06 PROCEDURE — 84443 ASSAY THYROID STIM HORMONE: CPT

## 2020-08-06 PROCEDURE — 82607 VITAMIN B-12: CPT

## 2020-08-06 PROCEDURE — 96361 HYDRATE IV INFUSION ADD-ON: CPT

## 2020-08-06 PROCEDURE — 51798 US URINE CAPACITY MEASURE: CPT

## 2020-08-06 PROCEDURE — 96360 HYDRATION IV INFUSION INIT: CPT

## 2020-08-06 PROCEDURE — 96372 THER/PROPH/DIAG INJ SC/IM: CPT

## 2020-08-06 PROCEDURE — 80053 COMPREHEN METABOLIC PANEL: CPT

## 2020-08-06 PROCEDURE — 99285 EMERGENCY DEPT VISIT HI MDM: CPT

## 2020-08-06 PROCEDURE — 97162 PT EVAL MOD COMPLEX 30 MIN: CPT

## 2020-08-06 PROCEDURE — 83690 ASSAY OF LIPASE: CPT

## 2020-08-06 PROCEDURE — 94640 AIRWAY INHALATION TREATMENT: CPT

## 2020-08-06 PROCEDURE — 97530 THERAPEUTIC ACTIVITIES: CPT

## 2020-08-06 PROCEDURE — 99284 EMERGENCY DEPT VISIT MOD MDM: CPT

## 2020-08-06 PROCEDURE — 80048 BASIC METABOLIC PNL TOTAL CA: CPT

## 2020-08-06 RX ADMIN — LATANOPROST SCH DROPS: 50 SOLUTION OPHTHALMIC at 20:26

## 2020-08-06 RX ADMIN — HEPARIN SODIUM SCH UNIT: 5000 INJECTION, SOLUTION INTRAVENOUS; SUBCUTANEOUS at 20:25

## 2020-08-06 RX ADMIN — SODIUM CHLORIDE, POTASSIUM CHLORIDE, SODIUM LACTATE AND CALCIUM CHLORIDE SCH MLS/HR: 600; 310; 30; 20 INJECTION, SOLUTION INTRAVENOUS at 18:05

## 2020-08-06 RX ADMIN — CARBIDOPA AND LEVODOPA SCH TAB: 50; 200 TABLET, EXTENDED RELEASE ORAL at 20:26

## 2020-08-06 RX ADMIN — ASPIRIN SCH MG: 81 TABLET, CHEWABLE ORAL at 20:26

## 2020-08-06 RX ADMIN — LATANOPROST SCH: 50 SOLUTION OPHTHALMIC at 17:07

## 2020-08-06 RX ADMIN — SODIUM CHLORIDE, PRESERVATIVE FREE SCH: 5 INJECTION INTRAVENOUS at 17:07

## 2020-08-06 RX ADMIN — ASPIRIN SCH: 81 TABLET, CHEWABLE ORAL at 17:09

## 2020-08-06 NOTE — ADVANCE CARE PLANNING NOTE
Advance Care Planning





- Planning Encounter


Date: 20


Time: 16:30


Purpose: 


Clarify goals of care.


Parties in Attendance: 


The patient and his wife, Patricia.


Decisional Capacity of the Patient: 


Patient has dementia and is unable to make his own medical decisions.





- Diagnosis for Encounter


(1) Parkinsons disease


Summary: 


He was diagnosed with Parkinson's disease about 3 years ago.  He follows with 

Dr. Benito Morocho at the [facility], [place].  He has been declining from physical 

standpoint over this period of time.  He also has dementia.  The patient is now 

relatively sedentary.  He is able to meet with a walker at times but he has not 

leave the house as much as he has in the past.








- Encounter


Subjective/Patient's Story: 


He is  to his wife, Patricia.  They have 4 children together.  2 sons and 2 

daughters.  He spent most of his life and [place].  He worked for a carpet 

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  They moved to [facility] to get 

her about 20 years ago.  [place] after 

they had retired and fell in love with the [place] and so they decided to move 

here.


Objective/Medical Story: 


He has a history of coronary artery disease and had stenting about 4 years ago 

at [facility].  He was diagnosed with Parkinson's disease about a year later.

 His wife states that he initially attributed his physical decline to just old 

age but he eventually saw a neurologist and he was diagnosed with Parkinson's 

disease.  He is currently on Sinemet.  She states that after his physical 

decline began, he also developed dementia.  She states he is now only oriented 

to self and location usually.  He will know who she is.  She states that he will

have his good days and bad days at times.  She felt he may have been a little 

bit more confused this past week but it appears he is relatively close to his 

baseline.  He has had poor oral intake she states has been difficult to have him

take enough oral intake.  He has been taking his Lasix on a daily basis.


Goals of Care: 


His wife has made it clear that the patient has advanced directives that state 

he is a DNR.  She does not want him to be a full code given his decline over 

this past period time with his dementia and Parkinson's disease.  She was going 

to ask his neurologist for referral to palliative care.  She was also looking to

follow-up with Dr. Talley to fill out a POLST form.  His wife is his primary 

caregiver she is been looking into assistance at home given she is scheduled to 

undergo knee replacement in September.


Plan: 


Patient we made a DNR to reflect his wishes per his wife.  They have been in the

process of getting a referral to palliative care, specifically [facility].  I 

have told him that I will contact cardiology tomorrow morning and if she has 

time we will ask for a consult otherwise we will look to set up an outpatient 

follow-up.  The wife is agreeable to this.  They will be following up with Dr. Talley to fill out a POLST form.  Will consult social work to help provide 

assistance for the patient at home.


Code Status: Do Not Attempt Resuscitation


Time spent on advance care plannin

## 2020-08-06 NOTE — ED PHYSICIAN DOCUMENTATION
History of Present Illness





- Stated complaint


Stated Complaint: MALE 





- Chief complaint


Chief Complaint: Neuro





- History obtained from


History obtained from: Family





- History of Present Illness


Timing: How many days ago (5)





- Additonal information


Additional information: 


85 year-old male brought into the emergency department with his wife for 

evaluation of  increased confusion and concerns about possible uti. Per his wife

this gentleman has a baseline history of dementia and Parkinson's and is not 

able to meaningfully contribute to the history.  She states that last week they 

went out to dinner and he had a Coke which he never does.  He had a coke because

he reported that his belly ached.  Since then he has had some abdominal pain but

she has noticed that when he urinates he is only dribbling small amounts of 

urine each time.  He has not had any fevers.  She feels that he is more confused

than normal but he has no focal neuro deficits





pmh: Parkinson's disease, congestive heart failure, history of prostate cancer. 

History of MI in 2016 status post LAD stenting.





meds: Prilosec, losartan, furosemide, metoprolol, potassium chloride, aspirin, 

atorvastatin, latanoprost, Advair, MiraLAX, donezapril








Review of Systems


Unable to obtain: Dementia, Other (hx obtained from wife)


Constitutional: denies: Fever


Nose: denies: Rhinorrhea / runny nose


Cardiac: denies: Chest pain / pressure, Palpitations


Respiratory: denies: Dyspnea, Cough


GI: reports: Abdominal Pain.  denies: Nausea, Vomiting


: reports: Unable to Void


Skin: denies: Rash, Lesions


Musculoskeletal: denies: Neck pain, Back pain


Neurologic: reports: Generalized weakness (History of Parkinson's).  denies: 

Numbness


Immunocompromised: denies: Immunocompromised, HIV/AIDS





PD PAST MEDICAL HISTORY





- Past Medical History


Cardiovascular: Hypertension, High cholesterol, Coronary artery disease, MI


Respiratory: Asthma, COPD


Neuro: Parkinson's


Endocrine/Autoimmune: None


GI: GERD, Colon polyps


: Frequency, Kidney stones, Other


HEENT: Glaucoma, Chronic sinusitis


Psych: None


Musculoskeletal: None


Derm: Psoriasis





- Past Surgical History


Past Surgical History: Yes


General: Colonoscopy


Cardiovascular: Coronary stent


HEENT: Cataracts, Other





- Present Medications


Home Medications: 


                                Ambulatory Orders











 Medication  Instructions  Recorded  Confirmed


 


Aspirin 81 mg PO DAILY PM 12/18/13 08/06/20


 


Fluticasone/Salmeterol [Advair 1 puffs INH BID PRN 12/18/13 08/06/20





500-50 Diskus]   


 


Atorvastatin [Lipitor] 20 mg PO DAILY 05/03/16 08/06/20


 


Metoprolol Tartrate 6.25 mg PO DAILY 05/03/16 08/06/20


 


Furosemide [Lasix] 20 mg PO DAILY #60 tablet 06/11/16 08/06/20


 


Albuterol Sulfate [Proair Hfa 8.5 gm IH QID PRN 08/06/20 08/06/20





Inhaler]   


 


Carbidopa/Levodopa 25/100 [Sinemet 1 each PO 0800 08/06/20 08/06/20





25 mg/100 mg]   


 


Carbidopa/Levodopa 25/100 [Sinemet 1.5 tab PO 1430 08/06/20 08/06/20





25 mg/100 mg]   


 


Carbidopa/Levodopa ER 50/200 0.5 tab PO 2100 08/06/20 08/06/20





[Sinemet Cr 50 mg/200 mg]   


 


Latanoprost 1 drops OP DAILY PM 08/06/20 08/06/20


 


Losartan [Cozaar] 25 mg PO DAILY 08/06/20 08/06/20


 


Omeprazole 20 mg PO DAILY 08/06/20 08/06/20


 


Potassium Chloride [Micro-K] 10 meq PO DAILY 08/06/20 08/06/20


 


polyethylene glycoL 3350 [Miralax] 17 gm PO DAILY PRN 08/06/20 08/06/20














- Allergies


Allergies/Adverse Reactions: 


                                    Allergies











Allergy/AdvReac Type Severity Reaction Status Date / Time


 


beta blockers Allergy  Unknown Uncoded 08/06/20 13:10














- Social History


Does the pt smoke?: No


Smoking Status: Never smoker


Does the pt drink ETOH?: Yes


Does the pt have substance abuse?: No





- Immunizations


Immunizations are current?: Yes





- POLST


Patient has POLST: No





PD ED PE EXPANDED





- General


General: Alert, No acute distress





- Cardiac


Cardiac: Regular Rate, Femoral strong equal, Pedal strong equal, Cap refill < 2 

sec





- Respiratory


Respiratory: Clear to ausultation sea.  No: Distress, Labored





- Abdomen


Abdomen: Normal Bowel sounds, Other (distended abomen with palpable midline 

hernia, easily reducible. ).  No: Tender to palpation





- Derm


Derm: Normal color, Warm and dry, Bruising (Bruising noted on forearms and 

hands)





- Neuro


Neuro: Confused, CNII-XII intact





- GCS


Eye Opening: Spontaneous


Motor: Obeys Commands


Verbal: Confused


Total: 14





Results





- Vitals


Vitals: 


                               Vital Signs - 24 hr











  08/06/20 08/06/20





  13:10 15:00


 


Temperature 36.8 C 


 


Heart Rate 75 73


 


Respiratory 16 18





Rate  


 


Blood Pressure 154/61 H 144/76 H


 


O2 Saturation 98 98








                                     Oxygen











O2 Source                      Room air

















- Labs


Labs: 


                                Laboratory Tests











  08/06/20 08/06/20 08/06/20





  14:21 14:21 14:21


 


WBC  9.7  


 


RBC  4.18 L  


 


Hgb  14.0  


 


Hct  43.5  


 


MCV  104.1 H  


 


MCH  33.5 H  


 


MCHC  32.2  


 


RDW  13.3  


 


Plt Count  157  


 


MPV  10.4  


 


Neut # (Auto)  7.7 H  


 


Lymph # (Auto)  0.8 L  


 


Mono # (Auto)  1.0  


 


Eos # (Auto)  0.1  


 


Baso # (Auto)  0.0  


 


Absolute Nucleated RBC  0.00  


 


Nucleated RBC %  0.0  


 


Sodium   140 


 


Potassium   4.3 


 


Chloride   108 


 


Carbon Dioxide   25 


 


Anion Gap   7.0 


 


BUN   41 H 


 


Creatinine   2.5 H 


 


Estimated GFR (MDRD)   25 L 


 


Glucose   118 H 


 


Lactic Acid    1.8


 


Calcium   9.0 


 


Total Bilirubin   1.2 H 


 


AST   52 H 


 


ALT   13 


 


Alkaline Phosphatase   83 


 


Total Protein   7.5 


 


Albumin   3.7 


 


Globulin   3.8 


 


Albumin/Globulin Ratio   1.0 


 


Lipase   36 


 


Urine Color   


 


Urine Clarity   


 


Urine pH   


 


Ur Specific Gravity   


 


Urine Protein   


 


Urine Glucose (UA)   


 


Urine Ketones   


 


Urine Occult Blood   


 


Urine Nitrite   


 


Urine Bilirubin   


 


Urine Urobilinogen   


 


Ur Leukocyte Esterase   


 


Ur Microscopic Review   


 


Urine Culture Comments   














  08/06/20





  14:45


 


WBC 


 


RBC 


 


Hgb 


 


Hct 


 


MCV 


 


MCH 


 


MCHC 


 


RDW 


 


Plt Count 


 


MPV 


 


Neut # (Auto) 


 


Lymph # (Auto) 


 


Mono # (Auto) 


 


Eos # (Auto) 


 


Baso # (Auto) 


 


Absolute Nucleated RBC 


 


Nucleated RBC % 


 


Sodium 


 


Potassium 


 


Chloride 


 


Carbon Dioxide 


 


Anion Gap 


 


BUN 


 


Creatinine 


 


Estimated GFR (MDRD) 


 


Glucose 


 


Lactic Acid 


 


Calcium 


 


Total Bilirubin 


 


AST 


 


ALT 


 


Alkaline Phosphatase 


 


Total Protein 


 


Albumin 


 


Globulin 


 


Albumin/Globulin Ratio 


 


Lipase 


 


Urine Color  YELLOW


 


Urine Clarity  CLEAR


 


Urine pH  5.5


 


Ur Specific Gravity  1.015


 


Urine Protein  NEGATIVE


 


Urine Glucose (UA)  NEGATIVE


 


Urine Ketones  NEGATIVE


 


Urine Occult Blood  NEGATIVE


 


Urine Nitrite  NEGATIVE


 


Urine Bilirubin  NEGATIVE


 


Urine Urobilinogen  0.2 (NORMAL)


 


Ur Leukocyte Esterase  NEGATIVE


 


Ur Microscopic Review  NOT INDICATED


 


Urine Culture Comments  NOT INDICATED














PD MEDICAL DECISION MAKING





- ED course


Complexity details: reviewed results, considered differential, d/w patient, d/w 

family


ED course: 





85-year-old male with a history of Parkinson's and dementia presents to the 

emergency department with increased difficulty urinating over the last week.  

Wife reports that he is only dribbling small amounts of urine and generally 

complained of abdominal pain.


- On exam of his labs it appears that he has developed acute kidney injury.  His

 creatinine has more than doubled over the last few weeks.  Creatinine now 2.5. 

 His BUN is elevated and I believe he may be dehydrated. This may be secondary 

to the diuretic use at home versus reduced oral intake in the setting of 

Parkinson's and dementia. Will initiate IV fluid hydration.


- I discussed laboratory findings with the wife.  We plan to admit the patient 

to observation overnight for hydration and reevaluation in the a.m.  I have 

encouraged her to continue to speak with his primary care doctor about 

palliative care which she is interested in pursuing.  However at this time this 

gentleman does remain a full code.





Departure





- Departure


Disposition: ED Place in Observation


Clinical Impression: 


 Acute kidney injury, Parkinsons disease





Dementia


Qualifiers:


 Dementia type: unspecified type Dementia behavioral disturbance: without 

behavioral disturbance Qualified Code(s): F03.90 - Unspecified dementia without 

behavioral disturbance

## 2020-08-06 NOTE — HISTORY & PHYSICAL EXAMINATION
Chief Complaint





- Chief Complaint


Chief Complaint: Weakness





History of Present Illness





- Admitted From


Admitted From:: Home





- History Obtained From


Records Reviewed: Yes


History obtained from: Patient, Spouse, ER Physician, EMR


Exam Limitations: History is limited from the patient as he has dementia at 

baseline





- History of Present Illness


HPI Comment/Other: 


This is a 85-year-old male with a past medical history significant for 

Parkinson's disease, dementia, coronary artery disease, congestive heart failure

with preserved ejection fraction, asthma/COPD who presents today due to 

worsening weakness over the past week.  Most of the history is obtained from the

patient's spouse as he has dementia at baseline and is a poor historian.  His 

wife, Patricia, tells me that the patient has been declining over the past few years

due to his Parkinson's and dementia.  Over the past week, he has become 

increasingly weak and has had difficulty ambulating.  At baseline he usually 

gets around with a walker.  She thought he may have had a urinary tract 

infection and that is why she brought him to the emergency department today.  Genaro munguia feels like he may be a little more confused than usual compared to his 

baseline.  At baseline he is oriented to self and her.  He does not know the 

date.  He usually will know that he lives on [facility].  She states he has 

had decreased oral intake as well over this past period of time.  He only drank 

20 ounces of flavored drink yesterday and a little bit of milk.  She has been 

thinking about palliative care recently and was looking into a referral to [facility]. Patient currently reports no abdominal pain, chest pain, dyspnea.  He 

does have chronic lower extremity swelling most prominent in his left leg.  He 

has had work-up in the past including Dopplers which were negative for DVT.  He 

does take 20 mg of Lasix daily.  His neurologist is Dr. Benito Morocho at [facility], [place], operated by [facility].





In the emergency department, he was found to be hemodynamically stable.  Labs 

revealed his creatinine was 2.5 compared to his baseline of 1.2.  His BUN is 

also elevated in the 40s.  His urinalysis was unremarkable.  Given his poor oral

intake and acute kidney injury, medicine was consulted for admission.





I did discuss goals of care with the patient spouse and she states he is a DNR.





History





- Past Medical History


Cardiovascular: reports: Hypertension, High cholesterol, Coronary artery 

disease, MI


Respiratory: reports: Asthma, COPD


Neuro: reports: Parkinson's


Endocrine/Autoimmune: reports: None


GI: reports: GERD, Colon polyps


: reports: Frequency, Kidney stones


HEENT: reports: Glaucoma, Chronic sinusitis


Psych: reports: None


Musculoskeletal: reports: None


Derm: reports: Psoriasis


MRSA Hx?: Yes





- Past Surgical History


General: reports: Colonoscopy


Cardiovascular: reports: Coronary stent


HEENT: reports: Cataracts





- Family & Social History


Family History Comment/Other: Both of his parents had cardiac problems.  His 

father lived until his 90s his mother lived until 102.  Both his brother and 

sister have also had cardiac problems.  They are both alive and in their 80s.


Living arrangement: At home


Living Situation: With spouse/s.o.


Social History Notes: He lives at home with his wife, Patricia.  They moved to 

[facility] from [place] about 20 years ago.  He was previously employed as 

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 

lamination for cards.  He did smoke for about 20 years.  This was less than a 

pack a day.  He quit in the 80s.  He now drinks a glass of wine every couple of 

days.  He did previously drink a glass or 2 on a daily basis.





- POLST


Patient has POLST: No





Meds/Allgy





- Home Medications


Home Medications: 


                                Ambulatory Orders











 Medication  Instructions  Recorded  Confirmed


 


Aspirin 81 mg PO DAILY PM 12/18/13 08/06/20


 


Fluticasone/Salmeterol [Advair 1 puffs INH BID PRN 12/18/13 08/06/20





500-50 Diskus]   


 


Atorvastatin [Lipitor] 20 mg PO DAILY 05/03/16 08/06/20


 


Metoprolol Tartrate 6.25 mg PO DAILY 05/03/16 08/06/20


 


Furosemide [Lasix] 20 mg PO DAILY #60 tablet 06/11/16 08/06/20


 


Albuterol Sulfate [Proair Hfa 8.5 gm IH QID PRN 08/06/20 08/06/20





Inhaler]   


 


Carbidopa/Levodopa 25/100 [Sinemet 1 each PO 0800 08/06/20 08/06/20





25 mg/100 mg]   


 


Carbidopa/Levodopa 25/100 [Sinemet 1.5 tab PO 1430 08/06/20 08/06/20





25 mg/100 mg]   


 


Carbidopa/Levodopa ER 50/200 0.5 tab PO 2100 08/06/20 08/06/20





[Sinemet Cr 50 mg/200 mg]   


 


Latanoprost 1 drops OP DAILY PM 08/06/20 08/06/20


 


Losartan [Cozaar] 25 mg PO DAILY 08/06/20 08/06/20


 


Omeprazole 20 mg PO DAILY 08/06/20 08/06/20


 


Potassium Chloride [Micro-K] 10 meq PO DAILY 08/06/20 08/06/20


 


polyethylene glycoL 3350 [Miralax] 17 gm PO DAILY PRN 08/06/20 08/06/20














- Allergies


Allergies/Adverse Reactions: 


                                    Allergies











Allergy/AdvReac Type Severity Reaction Status Date / Time


 


beta blockers Allergy  Unknown Uncoded 08/06/20 13:10














Review of Systems





- Constitutional


Constitutional: reports: Poor appetite





- Cardiovascular


Cariovascular: reports: Edema.  denies: Chest pain





- Respiratory


Respiratory: denies: SOB at rest, SOB with exertion





- Gastrointestinal


Gastrointestinal: denies: Abdominal pain, Nausea, Vomiting





- Neurological


Neurological: reports: Memory problems, Abnormal gait





- All Other Systems


All Other Systems: reports: Other (Review of systems is limited due to his 

dementia.)


Prior Level of Functionality: 


He has been declining due to his Parkinson's and dementia.  He will ambulate 

with a walker at baseline but has been more sedentary recently.  His wife is his

 primary caregiver.





Exam





- Vital Signs


Reviewed Vital Signs: Yes


Vital Signs: 





                                Vital Signs x48h











  Temp Pulse Resp BP Pulse Ox


 


 08/06/20 15:00   73  18  144/76 H  98


 


 08/06/20 13:10  36.8 C  75  16  154/61 H  98














- Physical Exam


General Appearance: positive: No acute distress, Alert


Eyes Bilateral: positive: Normal inspection, Conjunctivae nml


ENT: positive: Dry mucous membranes.  negative: No signs of dehydration


Neck: positive: Nml inspection


Respiratory: positive: No respiratory distress.  negative: Wheezes, Rales


Cardiovascular: positive: Regular rate & rhythm, No murmur.  negative: 

Tachycardia, Systolic murmur


Abdomen: positive: Non-tender, Nml bowel sounds.  negative: No distention, 

Tenderness, Guarding, Rebound, Abnml bowel sounds


Skin: positive: Warm, Dry


Extremities: positive: Pedal edema (He has about +2 pitting edema left lower 

extremity and trace to +1 in the right lower extremity.).  negative: Calf 

tenderness


Neurologic/Psychiatric: positive: Disoriented to place, Disoriented to time, 

Other (He is not have any focal motor deficits on exam.  He is oriented to self 

and his wife.  He does not know the year, month, location.  He does not know who

 the president is.  He was able to state his date of birth and was able to name 

his children.).  negative: Disoriented to person





Conclusion/Plan





- Problem List


(1) Acute kidney injury


Conclusion/Plan: 


Suspect is likely prerenal injury given poor oral intake affect that he is on 

Lasix and losartan at home.  His creatinine is elevated at 2.5 compared to 

baseline of 1.2.  We will hydrate him with IV fluids.  Hold his home Lasix and 

lisinopril.  Recheck renal function in the morning.








(2) History of coronary artery disease


Conclusion/Plan: 


Stable.  We will continue his home medications.








(3) Dementia


Conclusion/Plan: 


He appears to be at his baseline although might be slightly more confused than 

usual as he does not know the location of where he lives and his wife states he 

will usually know he lives on [facility].  This may be exacerbated due to 

his dehydration.  We will avoid sedatives.  Delirium precautions.


Qualifiers: 


   Dementia type: unspecified type   Dementia behavioral disturbance: without 

behavioral disturbance   Qualified Code(s): F03.90 - Unspecified dementia 

without behavioral disturbance   





(4) Chronic heart failure with preserved ejection fraction (HFpEF)


Conclusion/Plan: 


This does not appear to be an exacerbation.  The last echocardiogram available 

to me showed grade 1 diastolic dysfunction with a preserved ejection fraction.  

We will hold his home Lasix and hydrate him with IV fluids given his acute 

kidney injury.  His home Lasix may need to be held on discharge or even 

discontinued.  We will watch him closely as he is being hydrated given he alrea

dy he lower extremity edema.








(5) Parkinsons disease


Conclusion/Plan: 


Stable.  Continue his home medications.  It appears he has been declining for 

the past period of time and his wife is his primary caregiver.  Will consult 

social work to help provide assistance at home.








(6) COPD (chronic obstructive pulmonary disease)


Conclusion/Plan: 


Stable and not in exacerbation.  Continue home inhalers.








- Lab Results


Lab results reviewed: Yes


Fish Bones: 


                                 08/06/20 14:21





                                 08/06/20 14:21





Core Measures





- Anticipated LOS


I expect patient to be DC'd or transferred within 96 hours.: Yes





- Issues


Hospital Issues and Management Plan: 


85-year-old male with history of Parkinson's disease presents with progressive 

weakness.  Found to acute kidney injury on labs.  Will hydrate him with IV 

fluids and place in observation.  Repeat renal function in the morning.





- DVT/VTE - Prophylaxis


VTE/DVT Device ordered at admit?: Yes


VTE/DVT Prophylaxis med ordered at admit?: Yes

## 2020-08-07 LAB
ANION GAP SERPL CALCULATED.4IONS-SCNC: 10 MMOL/L (ref 6–13)
BASOPHILS NFR BLD AUTO: 0 10^3/UL (ref 0–0.1)
BASOPHILS NFR BLD AUTO: 0.6 %
BUN SERPL-MCNC: 39 MG/DL (ref 6–20)
CALCIUM UR-MCNC: 8.7 MG/DL (ref 8.5–10.3)
CHLORIDE SERPL-SCNC: 107 MMOL/L (ref 101–111)
CO2 SERPL-SCNC: 24 MMOL/L (ref 21–32)
CREAT SERPLBLD-SCNC: 2 MG/DL (ref 0.6–1.2)
EOSINOPHIL # BLD AUTO: 0.2 10^3/UL (ref 0–0.7)
EOSINOPHIL NFR BLD AUTO: 3.4 %
ERYTHROCYTE [DISTWIDTH] IN BLOOD BY AUTOMATED COUNT: 13.4 % (ref 12–15)
GLUCOSE SERPL-MCNC: 98 MG/DL (ref 70–100)
HGB UR QL STRIP: 12.9 G/DL (ref 14–18)
LYMPHOCYTES # SPEC AUTO: 1 10^3/UL (ref 1.5–3.5)
LYMPHOCYTES NFR BLD AUTO: 14.7 %
MCH RBC QN AUTO: 33.7 PG (ref 27–31)
MCHC RBC AUTO-ENTMCNC: 32.7 G/DL (ref 32–36)
MCV RBC AUTO: 103.1 FL (ref 80–94)
MONOCYTES # BLD AUTO: 0.6 10^3/UL (ref 0–1)
MONOCYTES NFR BLD AUTO: 9.3 %
NEUTROPHILS # BLD AUTO: 4.9 10^3/UL (ref 1.5–6.6)
NEUTROPHILS # SNV AUTO: 6.8 X10^3/UL (ref 4.8–10.8)
NEUTROPHILS NFR BLD AUTO: 71.7 %
PDW BLD AUTO: 10.7 FL (ref 7.4–11.4)
PLATELET # BLD: 143 10^3/UL (ref 130–450)
RBC MAR: 3.83 10^6/UL (ref 4.7–6.1)
SODIUM SERPLBLD-SCNC: 141 MMOL/L (ref 135–145)

## 2020-08-07 RX ADMIN — ALBUTEROL SULFATE PRN MG: 2.5 SOLUTION RESPIRATORY (INHALATION) at 13:45

## 2020-08-07 RX ADMIN — BUDESONIDE SCH MG: 0.5 SUSPENSION RESPIRATORY (INHALATION) at 20:02

## 2020-08-07 RX ADMIN — SODIUM CHLORIDE, PRESERVATIVE FREE SCH: 5 INJECTION INTRAVENOUS at 04:11

## 2020-08-07 RX ADMIN — SODIUM CHLORIDE, POTASSIUM CHLORIDE, SODIUM LACTATE AND CALCIUM CHLORIDE SCH MLS/HR: 600; 310; 30; 20 INJECTION, SOLUTION INTRAVENOUS at 04:16

## 2020-08-07 RX ADMIN — ALBUTEROL SULFATE PRN MG: 2.5 SOLUTION RESPIRATORY (INHALATION) at 20:02

## 2020-08-07 RX ADMIN — ATORVASTATIN CALCIUM SCH MG: 10 TABLET, FILM COATED ORAL at 08:39

## 2020-08-07 RX ADMIN — HEPARIN SODIUM SCH UNIT: 5000 INJECTION, SOLUTION INTRAVENOUS; SUBCUTANEOUS at 08:40

## 2020-08-07 RX ADMIN — METOPROLOL TARTRATE SCH MG: 25 TABLET, FILM COATED ORAL at 09:18

## 2020-08-07 RX ADMIN — HEPARIN SODIUM SCH UNIT: 5000 INJECTION, SOLUTION INTRAVENOUS; SUBCUTANEOUS at 21:05

## 2020-08-07 RX ADMIN — CARBIDOPA AND LEVODOPA SCH TAB: 25; 100 TABLET ORAL at 07:53

## 2020-08-07 RX ADMIN — LATANOPROST SCH DROPS: 50 SOLUTION OPHTHALMIC at 21:03

## 2020-08-07 RX ADMIN — CARBIDOPA AND LEVODOPA SCH TAB: 50; 200 TABLET, EXTENDED RELEASE ORAL at 21:02

## 2020-08-07 RX ADMIN — PANTOPRAZOLE SODIUM SCH MG: 40 TABLET, DELAYED RELEASE ORAL at 08:39

## 2020-08-07 RX ADMIN — ASPIRIN SCH MG: 81 TABLET, CHEWABLE ORAL at 21:03

## 2020-08-07 RX ADMIN — SODIUM CHLORIDE, POTASSIUM CHLORIDE, SODIUM LACTATE AND CALCIUM CHLORIDE SCH MLS/HR: 600; 310; 30; 20 INJECTION, SOLUTION INTRAVENOUS at 14:17

## 2020-08-07 RX ADMIN — BUDESONIDE SCH: 0.5 SUSPENSION RESPIRATORY (INHALATION) at 19:28

## 2020-08-07 RX ADMIN — SODIUM CHLORIDE, PRESERVATIVE FREE SCH: 5 INJECTION INTRAVENOUS at 19:28

## 2020-08-07 RX ADMIN — POTASSIUM CHLORIDE SCH MEQ: 750 CAPSULE, EXTENDED RELEASE ORAL at 08:39

## 2020-08-07 RX ADMIN — SODIUM CHLORIDE, POTASSIUM CHLORIDE, SODIUM LACTATE AND CALCIUM CHLORIDE SCH MLS/HR: 600; 310; 30; 20 INJECTION, SOLUTION INTRAVENOUS at 23:58

## 2020-08-07 RX ADMIN — BUDESONIDE SCH: 0.5 SUSPENSION RESPIRATORY (INHALATION) at 07:00

## 2020-08-07 NOTE — PROVIDER PROGRESS NOTE
Subjective





- Prog Note Date


Prog Note Date: 08/07/20





- Subjective


Subjective: 


Denies any chest pain or dyspnea.  He was able to eat breakfast without any 

difficulties this morning.  Wife is still concerned he is slightly confused 

compared to baseline.





Current Medications





- Current Medications


Current Medications: 





Active Medications





Acetaminophen (Tylenol)  650 mg PO Q4HR PRN


   PRN Reason: Pain 1 to 4


Albuterol ()  2.5 mg INH RTQ4H PRN


   PRN Reason: Wheezing


Aspirin ([place] Aspirin)  81 mg PO QPM [facility]


   Last Admin: 08/06/20 20:26 Dose:  81 mg


   Documented by: 


Atorvastatin Calcium (Lipitor)  20 mg PO DAILY [facility]


   Last Admin: 08/07/20 08:39 Dose:  20 mg


   Documented by: 


Budesonide (Pulmicort)  0.5 mg INH RTBID [facility]


Carbidopa/Levodopa (Sinemet Cr 50 Mg/200 Mg)  0.5 tab PO 2100 [facility]


   Last Admin: 08/06/20 20:26 Dose:  0.5 tab


   Documented by: 


Carbidopa/Levodopa (Sinemet 25 Mg/100 Mg)  1.5 tab PO 1430 [facility]


Carbidopa/Levodopa (Sinemet 25 Mg/100 Mg)  1 tab PO 0800 [facility]


   Last Admin: 08/07/20 07:53 Dose:  1 tab


   Documented by: 


Heparin Sodium (Porcine) ()  5,000 unit SUBQ BID [facility]


   Last Admin: 08/07/20 08:40 Dose:  5,000 unit


   Documented by: 


Lactated Ringer's (Lr)  1,000 mls @ 100 mls/hr IV .Q10H [facility]


   Last Admin: 08/07/20 04:16 Dose:  100 mls/hr


   Documented by: 


Latanoprost (Xalatan Ophth Drops)  1 drops EACHEYE QPM [facility]


   Last Admin: 08/06/20 20:26 Dose:  1 drops


   Documented by: 


Metoprolol Tartrate (Lopressor)  12.5 mg PO DAILY [facility]


   Last Admin: 08/07/20 09:18 Dose:  12.5 mg


   Documented by: 


Ondansetron HCl (Zofran Inj)  4 mg IVP Q6HR PRN


   PRN Reason: Nausea / Vomiting


Pantoprazole Sodium (Protonix)  40 mg PO DAILY [facility]


   Last Admin: 08/07/20 08:39 Dose:  40 mg


   Documented by: 


Polyethylene Glycol (Miralax)  17 gm PO DAILY PRN


   PRN Reason: Constipation


Potassium Chloride (Micro-K)  10 meq PO DAILY [facility]


   Last Admin: 08/07/20 08:39 Dose:  10 meq


   Documented by: 


Sodium Chloride (Normal Saline Flush 0.9%)  10 ml IVP PRN PRN


   PRN Reason: AS NEEDED PER PROVIDER ORDERS


Sodium Chloride (Normal Saline Flush 0.9%)  10 ml IVP 0100,0900,1700 [facility]


   Last Admin: 08/07/20 04:11 Dose:  Not Given


   Documented by: 





                                        





Aspirin 81 mg PO DAILY PM 12/18/13 


Atorvastatin [Lipitor] 20 mg PO DAILY 05/03/16 


Metoprolol Tartrate 12.5 mg PO BID 05/03/16 


Albuterol Sulfate [Proair Hfa Inhaler] 8.5 gm IH QID PRN 08/06/20 


Carbidopa/Levodopa 25/100 [Sinemet 25 mg/100 mg] 1 each PO 0800 08/06/20 


Carbidopa/Levodopa 25/100 [Sinemet 25 mg/100 mg] 1.5 tab PO 1430 08/06/20 


Carbidopa/Levodopa ER 50/200 [Sinemet Cr 50 mg/200 mg] 0.5 tab PO 2100 08/06/20 


Latanoprost 1 drops OP DAILY PM 08/06/20 


Losartan [Cozaar] 25 mg PO DAILY 08/06/20 


Omeprazole 20 mg PO DAILY 08/06/20 


Potassium Chloride [Micro-K] 10 meq PO DAILY 08/06/20 


polyethylene glycoL 3350 [Miralax] 17 gm PO DAILY PRN 08/06/20 


Donepezil HCl 10 mg PO DAILY 08/07/20 


Fluticasone Propion/Salmeterol [Fluticasone-Salmeterol 250-50] 1 puffs INH BID 

08/07/20 











Objective





- Vital Signs/Intake & Output


Reviewed Vital Signs: Yes


Vital Signs: 


                                Vital Signs x48h











  Temp Pulse Resp BP Pulse Ox


 


 08/07/20 07:25  36.9 C  69  16  148/68 H  98


 


 08/07/20 04:17  36.9 C  78  18  135/94 H  98











Intake & Output: 


                                 Intake & Output











 08/04/20 08/05/20 08/06/20 08/07/20





 23:59 23:59 23:59 23:59


 


Intake Total   2735 745


 


Output Total   100 


 


Balance   2635 745














- Objective


General Appearance: positive: No acute distress, Alert


Eyes Bilateral: positive: Normal inspection, Conjunctivae nml


ENT: positive: ENT inspection nml


Neck: positive: Nml inspection


Respiratory: positive: No respiratory distress.  negative: Wheezes, Rales


Cardiovascular: positive: Regular rate & rhythm, No murmur.  negative: 

Tachycardia


Abdomen: positive: Non-tender.  negative: No distention, Tenderness


Skin: positive: Warm, Dry


Extremities: positive: Pedal edema (+1 pitting edema in the bilateral lower 

extremities.  Slightly increased in the left lower extremity compared to the 

right.)


Neurologic/Psychiatric: positive: Disoriented to place, Disoriented to time, 

Other (He is able to move all 4 extremities.).  negative: Disoriented to person





- Lab Results


Fish Bones: 


                                 08/07/20 04:40





                                 08/07/20 04:40


Other Labs: 


                               Lab Results x24hrs











  08/07/20 08/07/20 08/06/20 Range/Units





  04:40 04:40 14:45 


 


WBC   6.8   (4.8-10.8)  x10^3/uL


 


RBC   3.83 L   (4.70-6.10)  10^6/uL


 


Hgb   12.9 L   (14.0-18.0)  g/dL


 


Hct   39.5 L   (42.0-52.0)  %


 


MCV   103.1 H   (80.0-94.0)  fL


 


MCH   33.7 H   (27.0-31.0)  pg


 


MCHC   32.7   (32.0-36.0)  g/dL


 


RDW   13.4   (12.0-15.0)  %


 


Plt Count   143   (130-450)  10^3/uL


 


MPV   10.7   (7.4-11.4)  fL


 


Neut # (Auto)   4.9   (1.5-6.6)  10^3/uL


 


Lymph # (Auto)   1.0 L   (1.5-3.5)  10^3/uL


 


Mono # (Auto)   0.6   (0.0-1.0)  10^3/uL


 


Eos # (Auto)   0.2   (0.0-0.7)  10^3/uL


 


Baso # (Auto)   0.0   (0.0-0.1)  10^3/uL


 


Absolute Nucleated RBC   0.00   x10^3/uL


 


Nucleated RBC %   0.0   /100WBC


 


Sodium  141    (135-145)  mmol/L


 


Potassium  3.9    (3.5-5.0)  mmol/L


 


Chloride  107    (101-111)  mmol/L


 


Carbon Dioxide  24    (21-32)  mmol/L


 


Anion Gap  10.0    (6-13)  


 


BUN  39 H    (6-20)  mg/dL


 


Creatinine  2.0 H    (0.6-1.2)  mg/dL


 


Estimated GFR (MDRD)  32 L    (>89)  


 


Glucose  98    ()  mg/dL


 


Lactic Acid     (0.5-2.2)  mmol/L


 


Calcium  8.7    (8.5-10.3)  mg/dL


 


Total Bilirubin     (0.2-1.0)  mg/dL


 


AST     (10-42)  IU/L


 


ALT     (10-60)  IU/L


 


Alkaline Phosphatase     ()  IU/L


 


Total Protein     (6.7-8.2)  g/dL


 


Albumin     (3.2-5.5)  g/dL


 


Globulin     (2.1-4.2)  g/dL


 


Albumin/Globulin Ratio     (1.0-2.2)  


 


Lipase     (22-51)  U/L


 


Urine Color    YELLOW  


 


Urine Clarity    CLEAR  (CLEAR)  


 


Urine pH    5.5  (5.0-7.5)  PH


 


Ur Specific Gravity    1.015  (1.002-1.030)  


 


Urine Protein    NEGATIVE  (NEGATIVE)  mg/dL


 


Urine Glucose (UA)    NEGATIVE  (NEGATIVE)  mg/dL


 


Urine Ketones    NEGATIVE  (NEGATIVE)  mg/dL


 


Urine Occult Blood    NEGATIVE  (NEGATIVE)  


 


Urine Nitrite    NEGATIVE  (NEGATIVE)  


 


Urine Bilirubin    NEGATIVE  (NEGATIVE)  


 


Urine Urobilinogen    0.2 (NORMAL)  (NORMAL)  E.U./dL


 


Ur Leukocyte Esterase    NEGATIVE  (NEGATIVE)  


 


Ur Microscopic Review    NOT INDICATED  


 


Urine Culture Comments    NOT INDICATED  














  08/06/20 08/06/20 08/06/20 Range/Units





  14:21 14:21 14:21 


 


WBC    9.7  (4.8-10.8)  x10^3/uL


 


RBC    4.18 L  (4.70-6.10)  10^6/uL


 


Hgb    14.0  (14.0-18.0)  g/dL


 


Hct    43.5  (42.0-52.0)  %


 


MCV    104.1 H  (80.0-94.0)  fL


 


MCH    33.5 H  (27.0-31.0)  pg


 


MCHC    32.2  (32.0-36.0)  g/dL


 


RDW    13.3  (12.0-15.0)  %


 


Plt Count    157  (130-450)  10^3/uL


 


MPV    10.4  (7.4-11.4)  fL


 


Neut # (Auto)    7.7 H  (1.5-6.6)  10^3/uL


 


Lymph # (Auto)    0.8 L  (1.5-3.5)  10^3/uL


 


Mono # (Auto)    1.0  (0.0-1.0)  10^3/uL


 


Eos # (Auto)    0.1  (0.0-0.7)  10^3/uL


 


Baso # (Auto)    0.0  (0.0-0.1)  10^3/uL


 


Absolute Nucleated RBC    0.00  x10^3/uL


 


Nucleated RBC %    0.0  /100WBC


 


Sodium   140   (135-145)  mmol/L


 


Potassium   4.3   (3.5-5.0)  mmol/L


 


Chloride   108   (101-111)  mmol/L


 


Carbon Dioxide   25   (21-32)  mmol/L


 


Anion Gap   7.0   (6-13)  


 


BUN   41 H   (6-20)  mg/dL


 


Creatinine   2.5 H   (0.6-1.2)  mg/dL


 


Estimated GFR (MDRD)   25 L   (>89)  


 


Glucose   118 H   ()  mg/dL


 


Lactic Acid  1.8    (0.5-2.2)  mmol/L


 


Calcium   9.0   (8.5-10.3)  mg/dL


 


Total Bilirubin   1.2 H   (0.2-1.0)  mg/dL


 


AST   52 H   (10-42)  IU/L


 


ALT   13   (10-60)  IU/L


 


Alkaline Phosphatase   83   ()  IU/L


 


Total Protein   7.5   (6.7-8.2)  g/dL


 


Albumin   3.7   (3.2-5.5)  g/dL


 


Globulin   3.8   (2.1-4.2)  g/dL


 


Albumin/Globulin Ratio   1.0   (1.0-2.2)  


 


Lipase   36   (22-51)  U/L


 


Urine Color     


 


Urine Clarity     (CLEAR)  


 


Urine pH     (5.0-7.5)  PH


 


Ur Specific Gravity     (1.002-1.030)  


 


Urine Protein     (NEGATIVE)  mg/dL


 


Urine Glucose (UA)     (NEGATIVE)  mg/dL


 


Urine Ketones     (NEGATIVE)  mg/dL


 


Urine Occult Blood     (NEGATIVE)  


 


Urine Nitrite     (NEGATIVE)  


 


Urine Bilirubin     (NEGATIVE)  


 


Urine Urobilinogen     (NORMAL)  E.U./dL


 


Ur Leukocyte Esterase     (NEGATIVE)  


 


Ur Microscopic Review     


 


Urine Culture Comments     














ABX Reporting


Has patient been on IV antibiotics over the past 48 hours?: No





Assessment/Plan





- Problem List


(1) Acute kidney injury


Impression: 


Renal function is improving but his creatinine is still double compared to his 

baseline.  This is appear to be prerenal injury likely due to poor oral intake. 

Given his fragility and decreased intake overall, we will keep him hospitalized 

for 1 more day for continued IV fluids.  We will continue to hold his Lasix and 

losartan.  These are likely to be discontinued on discharge and can be resumed 

on outpatient basis as necessary.  Continue to avoid nephrotoxins.  Monitor his 

renal function.








(2) Generalized weakness


Impression: 


This likely secondary to dehydration in the setting of a patient with advanced 

dementia and Parkinson's disease.  We will continue to treat him with IV fluids 

given his renal function still double compared to baseline.  Will consult PT.  

Social work consult.








(3) History of coronary artery disease


Impression: 


Stable.  We are continuing his home medications.








(4) Dementia


Impression: 


Wife feels he is little confused compared to baseline.  Suspect this may be a 

component delirium given the dehydration, acute kidney injury, and unfamiliar 

surroundings.  We will continue to hydrate him with IV fluids.  Avoid sedatives.

 Delirium precautions


Qualifiers: 


   Dementia type: unspecified type   Dementia behavioral disturbance: without 

behavioral disturbance   Qualified Code(s): F03.90 - Unspecified dementia 

without behavioral disturbance   





(5) Chronic heart failure with preserved ejection fraction (HFpEF)


Impression: 


Stable and not in exacerbation.  He does have lower extremity edema but he is 

not hypoxic and has no evidence of heart failure except for the edema.  He 

appeared hypovolemic on admission and he has responded well to IV fluids for his

acute kidney injury.  We will continue to hydrate him gently.  Continue to hold 

his home Lasix.








(6) Parkinsons disease


Impression: 


Stable.  Continue his home medications.  PT consult.








(7) COPD (chronic obstructive pulmonary disease)


Impression: 


Stable and not in exacerbation.  Continue home inhalers.

## 2020-08-08 VITALS — SYSTOLIC BLOOD PRESSURE: 126 MMHG | DIASTOLIC BLOOD PRESSURE: 90 MMHG

## 2020-08-08 LAB
ANION GAP SERPL CALCULATED.4IONS-SCNC: 7 MMOL/L (ref 6–13)
BASOPHILS NFR BLD AUTO: 0 10^3/UL (ref 0–0.1)
BASOPHILS NFR BLD AUTO: 0.5 %
BUN SERPL-MCNC: 34 MG/DL (ref 6–20)
CALCIUM UR-MCNC: 8.2 MG/DL (ref 8.5–10.3)
CHLORIDE SERPL-SCNC: 107 MMOL/L (ref 101–111)
CO2 SERPL-SCNC: 24 MMOL/L (ref 21–32)
CREAT SERPLBLD-SCNC: 1.7 MG/DL (ref 0.6–1.2)
EOSINOPHIL # BLD AUTO: 0.2 10^3/UL (ref 0–0.7)
EOSINOPHIL NFR BLD AUTO: 3.8 %
ERYTHROCYTE [DISTWIDTH] IN BLOOD BY AUTOMATED COUNT: 13.2 % (ref 12–15)
GLUCOSE SERPL-MCNC: 104 MG/DL (ref 70–100)
HGB UR QL STRIP: 10.7 G/DL (ref 14–18)
LYMPHOCYTES # SPEC AUTO: 0.8 10^3/UL (ref 1.5–3.5)
LYMPHOCYTES NFR BLD AUTO: 14 %
MCH RBC QN AUTO: 31.8 PG (ref 27–31)
MCHC RBC AUTO-ENTMCNC: 31.2 G/DL (ref 32–36)
MCV RBC AUTO: 101.8 FL (ref 80–94)
MONOCYTES # BLD AUTO: 0.5 10^3/UL (ref 0–1)
MONOCYTES NFR BLD AUTO: 9.6 %
NEUTROPHILS # BLD AUTO: 3.9 10^3/UL (ref 1.5–6.6)
NEUTROPHILS # SNV AUTO: 5.5 X10^3/UL (ref 4.8–10.8)
NEUTROPHILS NFR BLD AUTO: 71.7 %
PDW BLD AUTO: 10.2 FL (ref 7.4–11.4)
PLATELET # BLD: 132 10^3/UL (ref 130–450)
RBC MAR: 3.37 10^6/UL (ref 4.7–6.1)
SODIUM SERPLBLD-SCNC: 138 MMOL/L (ref 135–145)

## 2020-08-08 RX ADMIN — SODIUM CHLORIDE, PRESERVATIVE FREE SCH: 5 INJECTION INTRAVENOUS at 00:19

## 2020-08-08 RX ADMIN — POTASSIUM CHLORIDE SCH MEQ: 750 CAPSULE, EXTENDED RELEASE ORAL at 08:42

## 2020-08-08 RX ADMIN — PANTOPRAZOLE SODIUM SCH MG: 40 TABLET, DELAYED RELEASE ORAL at 08:42

## 2020-08-08 RX ADMIN — ALBUTEROL SULFATE PRN MG: 2.5 SOLUTION RESPIRATORY (INHALATION) at 07:18

## 2020-08-08 RX ADMIN — SODIUM CHLORIDE, PRESERVATIVE FREE SCH ML: 5 INJECTION INTRAVENOUS at 08:50

## 2020-08-08 RX ADMIN — CARBIDOPA AND LEVODOPA SCH TAB: 25; 100 TABLET ORAL at 08:42

## 2020-08-08 RX ADMIN — ATORVASTATIN CALCIUM SCH MG: 10 TABLET, FILM COATED ORAL at 08:42

## 2020-08-08 RX ADMIN — BUDESONIDE SCH MG: 0.5 SUSPENSION RESPIRATORY (INHALATION) at 07:18

## 2020-08-08 RX ADMIN — HEPARIN SODIUM SCH UNIT: 5000 INJECTION, SOLUTION INTRAVENOUS; SUBCUTANEOUS at 08:54

## 2020-08-08 RX ADMIN — METOPROLOL TARTRATE SCH MG: 25 TABLET, FILM COATED ORAL at 08:43

## 2020-08-08 NOTE — DISCHARGE SUMMARY
Discharge Summary


Admit Date: 08/06/20


Discharge Date: 08/08/20


Discharging Provider: Polo Maurice


Primary Care Provider: Teddy Talley


Code Status: Do Not Attempt Resuscitation


Condition at Discharge: Fair


Discharge Disposition: 06 Home Health Service





- DIAGNOSES


Admission Diagnoses: 


Acute kidney injury


History of coronary artery disease


Dementia


Chronic heart failure with preserved ejection fraction


Parkinson's disease


COPD


Discharge Diagnoses with Status of Each Condition: 


Acute kidney injury - improving.


Generalized weakness - improving.


History of coronary artery disease - stable.


Dementia - stable.


Chronic heart failure with preserved ejection fraction - stable.


Parkinson's disease - stable.


COPD - stable.





- HPI


History of Present Illness: 


This is a 85-year-old male with a past medical history significant for 

Parkinson's disease, dementia, coronary artery disease, congestive heart failure

with preserved ejection fraction, asthma/COPD who presents today due to 

worsening weakness over the past week.  Most of the history is obtained from the

patient's spouse as he has dementia at baseline and is a poor historian.  His 

wife, Patricia, tells me that the patient has been declining over the past few years

due to his Parkinson's and dementia.  Over the past week, he has become 

increasingly weak and has had difficulty ambulating.  At baseline he usually 

gets around with a walker.  She thought he may have had a urinary tract 

infection and that is why she brought him to the emergency department today.  

She feels like he may be a little more confused than usual compared to his 

baseline.  At baseline he is oriented to self and her.  He does not know the 

date.  He usually will know that he lives on [facility].  She states he has 

had decreased oral intake as well over this past period of time.  He only drank 

20 ounces of flavored drink yesterday and a little bit of milk.  She has been 

thinking about palliative care recently and was looking into a referral to [facility] S[facility]. Patient currently reports no abdominal pain, chest pain, dyspnea.  He 

does have chronic lower extremity swelling most prominent in his left leg.  He 

has had work-up in the past including Dopplers which were negative for DVT.  He 

does take 20 mg of Lasix daily.  His neurologist is Dr. Benito Morohco at [facility].





In the emergency department, he was found to be hemodynamically stable.  Labs 

revealed his creatinine was 2.5 compared to his baseline of 1.2.  His BUN is 

also elevated in the 40s.  His urinalysis was unremarkable.  Given his poor oral

intake and acute kidney injury, medicine was consulted for admission.





I did discuss goals of care with the patient spouse and she states he is a DNR.





- CONSULTS | PROCEDURES


Consultations: PT, Social Work





- HOSPITAL COURSE


Hospital Course: 


He was admitted to the floor for generalized weakness and acute kidney injury.  

His creatinine was elevated at 2.5 compared to a baseline of 1.1.  It was felt 

that her generalized weakness was secondary to dehydration as well as his acute 

kidney injury.  His home Lasix and losartan were held and he was treated with IV

fluids.  His creatinine improved to 2.0 the following day and 1.7 the day after.

 His creatinine was 1.7 on discharge.  His wife feels like his mentation has 

improved and he is just about back to his baseline.  He was evaluated by 

physical therapy and it was felt he may benefit from a skilled nursing facility 

but his wife preferred to take him home with home health.  She has already 

picked up a wheelchair from the [facility] and she will be following up on 

outpatient basis with social work and home health PT.  We did check a TSH and 

troponin which are both within normal limits. His urinalysis was also 

unremarkable. I have asked his wife to hold the losartan and Lasix until he 

follows up with his primary care provider in 2 weeks for repeat labs.  At that 

time, they can discuss resuming the losartan or Lasix.  His wife did ask about a

palliative care consult.  I did speak with Ana Sutherland of palliative care and 

she is aware of the patient.  A formal consult was not obtained during his 

hospitalization as it was not urgent.  The patient's wife has already spoken to 

the patient's neurologist about placing a consult.





- ALLERGIES


Allergies/Adverse Reactions: 


                                    Allergies











Allergy/AdvReac Type Severity Reaction Status Date / Time


 


beta blockers Allergy  Unknown Uncoded 08/06/20 13:10














- MEDICATIONS


Home Medications: 


                                Ambulatory Orders











 Medication  Instructions  Recorded  Confirmed


 


Aspirin 81 mg PO DAILY PM 12/18/13 08/06/20


 


Atorvastatin [Lipitor] 20 mg PO DAILY 05/03/16 08/06/20


 


Metoprolol Tartrate 12.5 mg PO BID 05/03/16 08/07/20


 


Albuterol Sulfate [Proair Hfa 8.5 gm IH QID PRN 08/06/20 08/06/20





Inhaler]   


 


Carbidopa/Levodopa 25/100 [Sinemet 1 each PO 0800 08/06/20 08/06/20





25 mg/100 mg]   


 


Carbidopa/Levodopa 25/100 [Sinemet 1.5 tab PO 1430 08/06/20 08/06/20





25 mg/100 mg]   


 


Carbidopa/Levodopa ER 50/200 0.5 tab PO 2100 08/06/20 08/06/20





[Sinemet Cr 50 mg/200 mg]   


 


Latanoprost 1 drops OP DAILY PM 08/06/20 08/06/20


 


Omeprazole 20 mg PO DAILY 08/06/20 08/06/20


 


Potassium Chloride [Micro-K] 10 meq PO DAILY 08/06/20 08/06/20


 


polyethylene glycoL 3350 [Miralax] 17 gm PO DAILY PRN 08/06/20 08/06/20


 


Donepezil HCl 10 mg PO DAILY 08/07/20 08/07/20


 


Fluticasone Propion/Salmeterol 1 puffs INH BID 08/07/20 08/07/20





[Fluticasone-Salmeterol 250-50]   














- PHYSICAL EXAM AT DISCHARGE


General Appearance: positive: No acute distress, Alert


Eyes Bilateral: positive: Normal inspection, Conjunctivae nml


ENT: positive: ENT inspection nml


Neck: positive: Nml inspection


Respiratory: positive: No respiratory distress.  negative: Wheezes, Rales


Cardiovascular: positive: Regular rate & rhythm, No murmur.  negative: 

Tachycardia, Systolic murmur


Abdomen: positive: Non-tender.  negative: No distention, Tenderness, Guarding, 

Rebound


Back: positive: Other (No lumbar spine tenderness or paraspinal tenderness.)


Skin: positive: Warm, Dry


Extremities: positive: Pedal edema (+1 to +2 pitting edema in left lower 

extremity. +1 pitting edema in right lower extremity.)


Neurologic/Psychiatric: positive: Disoriented to place, Disoriented to time, 

Other (Sensation is grossly intact in all 4 extremities.  He is able to move all

4 extremities and there are no deficits.  He is oriented to self but not to 

location or year.  He does recognize his wife.).  negative: Disoriented to 

person





- LABS


Result Diagrams: 


                                 08/08/20 04:58





                                 08/08/20 04:58





- DIAGNOSTIC IMAGING


Diagnostic Imaging Results: Final report reviewed





- FOLLOW UP


Follow Up: 


He was asked to follow-up with his primary care provider as scheduled in 2 days.





- TIME SPENT


Time Spent in Discharge (Minutes): 31

## 2020-08-08 NOTE — DISCHARGE PLAN
Discharge Plan


Problem Reviewed?: Yes


Disposition: 06 Home Health Service


Condition: Fair


Diet: Low Sodium


Activity Restrictions: Activity as Tolerated


Assistance Devices: Wheelchair


Health Concerns: 


You are seen in the hospital because of weakness and a little bit of confusion. 

This was felt to be due to dehydration.  Your kidney numbers were also elevated 

compared to baseline.  You were treated with IV fluids and your kidney numbers 

have improved although they are still a little bit elevated compared to your 

baseline.  We checked your thyroid and it was normal.  We checked your heart nu

mbers and there was no evidence of a heart attack.  You are now feeling a little

bit better.  This was likely due to dehydration.  Please make sure you drink 

plenty of fluids at home.  If you develop worsening lower leg swelling then 

please call your primary care provider.





I am concerned that your strength may not return to where it was just a couple 

of weeks ago and you may need a wheelchair to get around most of the time.  I 

did speak with Ana Sutherland of palliative care and I recommend that your 

neurologist place or referral to her so that you may see her on an outpatient 

basis.


Plan of Treatment: 


Stop taking the losartan and Lasix at home until you follow up with primary care

doctor.  You may develop some leg swelling because of this but these can also 

affect your kidney numbers especially when you are dehydrated.  Please follow-up

with your primary care provider as scheduled in 2 days.  It is recommended that 

you have blood work done at that time to make sure your kidney numbers are 

stable.  Your primary care doctor can tell you when to resume the Lasix and 

losartan.


Care Goals: 


Please return to the emergency department for any worsening confusion, fever, 

chest pain, shortness of breath.


Assessment: 


The patient's wife expressed understanding of the treatment plan.


Additional Instructions or Follow Up instructions: 


These follow-up with your primary care provider as scheduled in 2 days.


No Smoking: If you smoke, Please STOP!  Call [phone] for help.


Follow-up with: 


Teddy Talley MD [Primary Care Provider] -

## 2020-08-14 ENCOUNTER — HOSPITAL ENCOUNTER (OUTPATIENT)
Dept: HOSPITAL 76 - LAB.WCP | Age: 85
Discharge: HOME | End: 2020-08-14
Attending: FAMILY MEDICINE
Payer: MEDICARE

## 2020-08-14 DIAGNOSIS — N18.9: Primary | ICD-10-CM

## 2020-08-14 LAB
ANION GAP SERPL CALCULATED.4IONS-SCNC: 10 MMOL/L (ref 6–13)
BUN SERPL-MCNC: 17 MG/DL (ref 6–20)
CALCIUM UR-MCNC: 8.6 MG/DL (ref 8.5–10.3)
CHLORIDE SERPL-SCNC: 104 MMOL/L (ref 101–111)
CO2 SERPL-SCNC: 25 MMOL/L (ref 21–32)
CREAT SERPLBLD-SCNC: 1.2 MG/DL (ref 0.6–1.2)
GLUCOSE SERPL-MCNC: 102 MG/DL (ref 70–100)
SODIUM SERPLBLD-SCNC: 139 MMOL/L (ref 135–145)

## 2020-08-14 PROCEDURE — 36415 COLL VENOUS BLD VENIPUNCTURE: CPT

## 2020-08-14 PROCEDURE — 80048 BASIC METABOLIC PNL TOTAL CA: CPT

## 2020-09-10 ENCOUNTER — HOSPITAL ENCOUNTER (OUTPATIENT)
Dept: HOSPITAL 76 - EMS | Age: 85
End: 2020-09-10
Attending: SURGERY
Payer: MEDICARE

## 2020-09-10 DIAGNOSIS — Z03.89: Primary | ICD-10-CM

## 2020-10-01 ENCOUNTER — HOSPITAL ENCOUNTER (OUTPATIENT)
Dept: HOSPITAL 76 - EMS | Age: 85
End: 2020-10-01
Attending: SURGERY
Payer: MEDICARE

## 2020-10-01 DIAGNOSIS — Z03.89: Primary | ICD-10-CM

## 2020-10-02 ENCOUNTER — HOSPITAL ENCOUNTER (OUTPATIENT)
Dept: HOSPITAL 76 - PC | Age: 85
Discharge: HOME | End: 2020-10-02
Attending: NURSE PRACTITIONER
Payer: MEDICARE

## 2020-10-02 DIAGNOSIS — Z51.5: Primary | ICD-10-CM

## 2020-10-02 DIAGNOSIS — Z66: ICD-10-CM

## 2020-10-02 DIAGNOSIS — G20: ICD-10-CM

## 2020-10-02 DIAGNOSIS — F02.80: ICD-10-CM

## 2020-10-02 DIAGNOSIS — N18.30: ICD-10-CM

## 2020-10-02 DIAGNOSIS — Z87.891: ICD-10-CM

## 2020-10-02 PROCEDURE — 99345 HOME/RES VST NEW HIGH MDM 75: CPT

## 2020-10-02 NOTE — CONSULTATION NOTE
Palliative Care Consultation





- Referral


Referring Provider: Dr. Teddy Talley


Time of Visit: 5966-3401


Referral setting: Hospitalized patient


Referral Reason: Advanced Parkinsons with dementia/Goals of Care





- Information Sources


Records reviewed: Previous records reviewed


History/Review of Systems obtained from: Patient, Family (wife Patricia present)


Exam limitations: Clinical condition (patient with severe STM deficits; word 

finding; mod dementai)





- History of Present Illness


Brief History of Present Illness: 


This is an 86-year-old gentleman with Parkinson's, who is diagnosed in 2017.  It

was actually brought to wife's attention, from a friend whom she flew kites 

with, noticing shuffled gait, increased confusion, decline in functional status.

 He then did follow-up with neurology, and is currently on Sinemet, with recent 

change of increase in dosing.  He did go on to develop dementia, though does not

appear to have behavioral disturbances, has short-term memory deficits, word 

finding, and is seldom oriented to anything but place and person.  Patient has 

been hospitalized in August, after a significant decline in both cognitive and 

functional status, and found to have acute kidney injury, was attributed to poor

intake, and medication.  He did receive home health physical therapy and 

occupational therapy after discharge, which did help with transfer training, he 

does have a home exercise program, but overall is very sedentary.





Patient is continued to sleep more, his cognitive status fluctuates up and down,

he can ambulate.  Did have patient demonstrate does need significant amount of 

cueing for foot placement, sequencing, but once on walker was able to move 

forward though has stooped gait, shuffled gait which improved with ambulation.  

Patient did have intermittent freezing.  When patient is sitting in chair, he 

does demonstrate poor truncal stability and leans to the left.  Wife has 

observed this over the last few weeks.  He also has intermittent choking, did 

not observe any while taking pills, but reports often happens with eating.  

Patient does have strong cough effort, and so far is been able to clear his 

secretions.  He has not had any weight loss.  She reports things are fairly 

simple, they watch documentary's, unfortunately with the pandemic, have limited 

social interaction though they have been going back to the [facility] on Thursday 

nights for dinner.  He does have a power chair, which he still can manage 

independently.  Patient does have difficulty engaging in conversation, does get 

overwhelmed with too many questions, does have flat affect, when encouraged can 

make eye contact. Patient is incontinent of urine at times, has been continent 

of bowel, still is able to toilet himself.  Does need some assistance with 

dressing and bathing.Patient was much more functional late spring, has continued

to deteriorate, with pierce with hospitalization, has had some improvement since 

then.





Wife does have pending knee replacement next week, will need 24/7 care.  Is 

working on 24-hour care provider support.  She is hoping that this will continue

as far as support for caregiving.  Wife does present with symptoms of caregiver 

fatigue.  He does need constant cueing, supervision, and though it is low 

symptom burden does have high care needs.








Medical/Surgical History





- Past Medical History


Cardiovascular: reports: Hypertension, High cholesterol, Coronary artery 

disease, MI


Respiratory: reports: Asthma, COPD


Neuro: Dementia, Parkinson's


Endocrine/Autoimmune: reports: None


GI: reports: GERD, Colon polyps


: reports: Incontinence, Frequency, Kidney stones, Other (prostate cancer)


HEENT: reports: Glaucoma, Chronic sinusitis


Psych: reports: None


Musculoskeletal: reports: Fatigue, Chronic back pain


Derm: reports: Psoriasis


MRSA Hx?: Yes





- Past Surgical History


General: reports: Colonoscopy


Cardiovascular: reports: Coronary stent


HEENT: reports: Cataracts





- Substance History


Use: Uses substance without health or social issues: Tobacco (previously quit), 

Alcohol (moderate)





Social History





- Living Situation


Living arrangement: At home


Living Situation: With spouse/s.o.


Support System: 


Patient has 4 children, he does have 1 son who does keep in touch more 

regularly.  Wife Patricia, is sole caregiver.  She has been participating in the 

dementia and Parkinson's support group, with the pandemic she has very much 

enjoyed being on zoom.  They do have a  that helps some as well.  

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 

with the cells person.  They moved to [facility] together about 20 years 

ago.  They have traveled much in their boat, after they retired, also they were 

avid kite flyers, including dual flight kind in competition. 








Family History





- Family History


Family History: Mother:  (mother lived to 92, father 102), CAD, Father: 

, CAD, Sister: Alive and Well (age 90 and 87 still alive), Brother: 

Alive and Well





Medications/Allergies





- Medications


Home Medications: 


                                Ambulatory Orders











 Medication  Instructions  Recorded  Confirmed


 


Aspirin 81 mg PO DAILY PM 12/18/13 10/02/20


 


Metoprolol Tartrate 12.5 mg PO BID 05/03/16 10/02/20


 


Albuterol Sulfate [Proair Hfa 8.5 gm IH QID PRN 08/06/20 10/02/20





Inhaler]   


 


Carbidopa/Levodopa 25/100 [Sinemet 2 each PO 0800 08/06/20 10/02/20





25 mg/100 mg]   


 


Carbidopa/Levodopa 25/100 [Sinemet 2 tab PO 1430 08/06/20 10/02/20





25 mg/100 mg]   


 


Carbidopa/Levodopa ER 50/200 1 tab PO 2100 08/06/20 10/02/20





[Sinemet Cr 50 mg/200 mg]   


 


Latanoprost 1 drops OP DAILY PM 08/06/20 10/02/20


 


Omeprazole 20 mg PO DAILY 08/06/20 10/02/20


 


Potassium Chloride [Micro-K] 10 meq PO DAILY 08/06/20 10/02/20


 


Donepezil HCl 10 mg PO DAILY 08/07/20 10/02/20


 


Fluticasone Propion/Salmeterol 1 puffs INH BID 10/02/20 10/02/20





[Wixela 250-50 Inhub]   


 


Psyllium Husk [Fiber] 1 tab PO DAILY 10/02/20 10/02/20














- Allergies


Allergies/Adverse Reactions: 


                                    Allergies











Allergy/AdvReac Type Severity Reaction Status Date / Time


 


beta blockers Allergy  Unknown Uncoded 20 13:10














Review of Systems





- Constitutional


Constitutional: reports: Fatigue (takes many naps), Weight stable.  denies: 

Fever





- Cardiovascular


Cardiovascular: reports: Edema, Exertional dyspnea, Decr. exercise tolerance.  

denies: Chest pain





- Respiratory


Respiratory: reports: Cough (with eating), SOB with exertion.  denies: SOB at 

rest





- Gastrointestinal


Gastrointestinal: reports: Good appetite.  denies: Constipation, Nausea, 

Reflux/heartburn (had temporarily been out of prilosec)





- Genitourinary


Genitourinary: reports: Frequency, Incontinence





- Musculoskeletal


Musculoskeletal: reports: Back pain, Stiffness, Limited range of motion, Muscle 

weakness, Assistive devices (uses walker in house), Transfer issues (uses 

wheelchair longer distances or powerchair when out)





- Integumentary


Integumentary: reports: Dryness





- Neurological


Neurological: reports: General weakness, Memory problems, Abnormal gait, Slurred

 speech





- Psychiatric


Psychiatric: denies: Depression, Anxiety





- Hematologic/Lymphatic


Hematologic/Lymphatic: reports: Bruising.  denies: Recurrent infections





- All Other Systems


All Other Systems: reports: Other (limited with STM deficits; wife provided most

 answers)





Physical Exam





- Vital Signs


Temperature: 96.6 C


Pulse Rate: 52


Respiratory Rate: 16


O2 Saturation: 96 (ra @ rest)


Blood Pressure: 142/68





- Physical Exam


General Appearance: positive: Alert, Other (slow to respond)


Eyes Bilateral: positive: Conjunctivae nml


ENT: positive: No signs of dehydration


Neck: positive: Trachea midline


Cardiovascular: positive: Regular rate & rhythm


Respiratory: positive: No respiratory distress, Diminished in bases.  negative: 

Wheezes, Rales, Rhonchi


Abdomen: positive: Non-tender, Soft, Obese


Skin: positive: Pallor, Dryness


Extremities: positive: Pedal edema (left greater than right; leans and sits to 

the left)


Neurologic/Psychiatric: positive: Disoriented to time, Weakness, Slurred/abnml 

speech, Flat affect





Palliative Care





- POLST


Patient has POLST: Yes


POLST Status: DNR, Selective Treatment (completed at visit)


Pain: Pain unchanged, Location (mild back pain and stiffness in am; sleeps flat 

on back rarely moves at night; occasional pain left knee)


Tiredness/Fatigue: Moderate (4-6)


Drowsiness/Sedation: Moderate (4-6)


Nausea: None


Anorexia: None


Dyspnea: Mild (1-3) (with activity)


Depression: None


Anxiety: Mild (1-3) (with visit)


Feelings of wellbeing/Perceived Quality of Life: Fair, Worsening


Sleep: Sleeps well


Constipation: No


Performance Status: 


please see HPI








- Palliative Care


Discussion: 


Wife getting ready for knee surgery, feels she needs to do this now with her 

current age and expected continued decline of her .  Has made 

arrangements for  care.  Does recognize patient has declined over the last 

few months, and acutely during hospitalization, but did regain some but not back

 to previous level of functioning.  She is anticipating further decline in 

functional status mobility, she does have quite a bit of support of equipment, 

has had PT/OT.  She has found it helpful to talk with others both the 

Parkinson's and dementia group.





Patient himself was unable to engage much in the conversation, though we did 

have a conversation regarding POLST.  Plan had been for him to complete this 

with his PCP, but has not had yet.  We discussed it is important given that they

 have been calling 911 his last fall was .  He has a very directive DURABLE 

POWER OF  as well as healthcare directive that is easily supportive of 

choices made on the POLST.  Had both patient and wife sign, as patient does have

 mild dementia, and is difficult to understand the nuances of medical decision 

making.  We did tessa DNA R/allow natural death and selective treatment.  With a 

focus on quality of life spending time with family and treating reversible 

conditions.  They do not want anything prolonged, particularly in the context of

 suffering.  They have not had conversations about end-of-life care, she does 

not feel like she can make these decisions until they come upon them as in the 

context of when and what might be the circumstances for this.  We discussed this

 is part of what the role of palliative care provides is assistance along the 

journey weighing those benefits and burdens of decision-making as they come up








Impression and Recommendations





- Palliative Care


Impression: 


This is an 86-year-old gentleman with advanced Parkinson's, with ongoing 

functional and cognitive decline.  He is cared for at home by his wife, who is a

 good advocate and attentive to patient's care needs. Patient does present with 

dementia, short-term memory deficits, oriented to person and place only.  Wife 

reports this is fluctuating in severity, but has needed more cueing and more 

attentiveness as far as executive function and concerns for safety. Patient does

 not present with high symptom burden, advanced care planning addressed at 

today's visit. Palliative care to provide support regarding symptom management 

and anticipatory guidance, coordination of care.





Recommendations/Counseling Done: 


1. Advanced Parkinson's.  Patient does have advanced Parkinson's with dementia, 

has had both functional and progressive cognitive decline.  Currently without 

any significant behavioral disturbances.  Patient does though need significant 

manner cueing and oversight.  Recent adjustment of carbidopa levodopa, for 

worsening freezing appears to be of help.  They also started him on some 

donezepil, wife has not seen much improvement and notes ongoing decline. They 

are due to see the neurologist in November, will continue to monitor.  They have

 already had PT/OT for maximum transfer training and safety.  Patient does have 

equipment for support, we did discuss about sit to stand lift in the future if 

needed.  Patient's biggest risk is a sequela from a fall or aspiration pneumonia

 with worsening choking episodes.





2.  Chronic kidney disease stage III.  Patient's last creatinine was 1.2.  Has 

had medications adjusted, is due for repeat labs.  Does see cardiology, has 

recently been evaluated.  Patient is drinking more fluids, does need 

encouragement. 





 


3.Advanced care planning.  Reviewed patient's advanced directives, is supportive

 particularly in the context of most recent hospitalization, translate into a 

POLST.  Patient is a DNA R/a now that should breath, and selective treatment w

ith the goal of treating medical conditions while avoiding burdensome measures. 

 Instructed to place a POLST on the fridge, particularly since they often call 

911 for lift assist. Patient's quality of life has continued to deteriorate, 

caregiver burden is fairly significant. Counseling provided regarding the role 

of palliative care in the context of the trajectory of Parkinson's, and 

anticipatory guidance of the journey.  Weighing benefits and burdens of 

treatment options as they come up. 





Mike Index: This looks at community dwelling adults age 65 years and older, and

 all cause 1 year mortality.  Patient scores is 6, which is 24.9%, of 1 year 

mortality.  Risk calculators cannot predict the future for anyone individual but

 can give an estimate of how many people with similar risk factors will live and

 how many will die.  They cannot identify who will live and who will die.





Time Spent: 


105 minutes with greater than 50% of this done in counseling regarding goals of 

care, disease trajectory, current functional status, and concerns maximizing 

safety

## 2020-10-14 ENCOUNTER — HOSPITAL ENCOUNTER (OUTPATIENT)
Dept: HOSPITAL 76 - LAB.WCP | Age: 85
Discharge: HOME | End: 2020-10-14
Attending: INTERNAL MEDICINE
Payer: MEDICARE

## 2020-10-14 DIAGNOSIS — I10: ICD-10-CM

## 2020-10-14 DIAGNOSIS — I25.5: Primary | ICD-10-CM

## 2020-10-14 LAB
ANION GAP SERPL CALCULATED.4IONS-SCNC: 7 MMOL/L (ref 6–13)
BUN SERPL-MCNC: 23 MG/DL (ref 6–20)
CALCIUM UR-MCNC: 9.2 MG/DL (ref 8.5–10.3)
CHLORIDE SERPL-SCNC: 104 MMOL/L (ref 101–111)
CO2 SERPL-SCNC: 28 MMOL/L (ref 21–32)
CREAT SERPLBLD-SCNC: 1.1 MG/DL (ref 0.6–1.2)
GLUCOSE SERPL-MCNC: 88 MG/DL (ref 70–100)
SODIUM SERPLBLD-SCNC: 139 MMOL/L (ref 135–145)

## 2020-10-14 PROCEDURE — 36415 COLL VENOUS BLD VENIPUNCTURE: CPT

## 2020-10-14 PROCEDURE — 80048 BASIC METABOLIC PNL TOTAL CA: CPT

## 2020-10-31 ENCOUNTER — HOSPITAL ENCOUNTER (EMERGENCY)
Dept: HOSPITAL 76 - ED | Age: 85
Discharge: HOME | End: 2020-10-31
Payer: MEDICARE

## 2020-10-31 VITALS — SYSTOLIC BLOOD PRESSURE: 155 MMHG | DIASTOLIC BLOOD PRESSURE: 81 MMHG

## 2020-10-31 DIAGNOSIS — F02.80: ICD-10-CM

## 2020-10-31 DIAGNOSIS — Z85.46: ICD-10-CM

## 2020-10-31 DIAGNOSIS — Z87.891: ICD-10-CM

## 2020-10-31 DIAGNOSIS — R31.0: Primary | ICD-10-CM

## 2020-10-31 DIAGNOSIS — G20: ICD-10-CM

## 2020-10-31 DIAGNOSIS — I10: ICD-10-CM

## 2020-10-31 LAB
ANION GAP SERPL CALCULATED.4IONS-SCNC: 10 MMOL/L (ref 6–13)
BASOPHILS NFR BLD AUTO: 0 10^3/UL (ref 0–0.1)
BASOPHILS NFR BLD AUTO: 0.5 %
BUN SERPL-MCNC: 25 MG/DL (ref 6–20)
CALCIUM UR-MCNC: 9 MG/DL (ref 8.5–10.3)
CHLORIDE SERPL-SCNC: 102 MMOL/L (ref 101–111)
CLARITY UR REFRACT.AUTO: CLEAR
CO2 SERPL-SCNC: 29 MMOL/L (ref 21–32)
CREAT SERPLBLD-SCNC: 1.2 MG/DL (ref 0.6–1.2)
EOSINOPHIL # BLD AUTO: 0.3 10^3/UL (ref 0–0.7)
EOSINOPHIL NFR BLD AUTO: 4.6 %
ERYTHROCYTE [DISTWIDTH] IN BLOOD BY AUTOMATED COUNT: 14.2 % (ref 12–15)
GLUCOSE SERPL-MCNC: 106 MG/DL (ref 70–100)
GLUCOSE UR QL STRIP.AUTO: NEGATIVE MG/DL
HGB UR QL STRIP: 13.7 G/DL (ref 14–18)
KETONES UR QL STRIP.AUTO: NEGATIVE MG/DL
LYMPHOCYTES # SPEC AUTO: 1 10^3/UL (ref 1.5–3.5)
LYMPHOCYTES NFR BLD AUTO: 14.1 %
MCH RBC QN AUTO: 32.2 PG (ref 27–31)
MCHC RBC AUTO-ENTMCNC: 32.5 G/DL (ref 32–36)
MCV RBC AUTO: 99.1 FL (ref 80–94)
MONOCYTES # BLD AUTO: 0.6 10^3/UL (ref 0–1)
MONOCYTES NFR BLD AUTO: 7.7 %
NEUTROPHILS # BLD AUTO: 5.4 10^3/UL (ref 1.5–6.6)
NEUTROPHILS # SNV AUTO: 7.4 X10^3/UL (ref 4.8–10.8)
NEUTROPHILS NFR BLD AUTO: 72.7 %
NITRITE UR QL STRIP.AUTO: NEGATIVE
PDW BLD AUTO: 9.9 FL (ref 7.4–11.4)
PH UR STRIP.AUTO: 5.5 PH (ref 5–7.5)
PLATELET # BLD: 157 10^3/UL (ref 130–450)
PROT UR STRIP.AUTO-MCNC: NEGATIVE MG/DL
RBC # UR STRIP.AUTO: (no result) /UL
RBC # URNS HPF: (no result) /HPF (ref 0–5)
RBC MAR: 4.25 10^6/UL (ref 4.7–6.1)
SODIUM SERPLBLD-SCNC: 141 MMOL/L (ref 135–145)
SP GR UR STRIP.AUTO: 1.02 (ref 1–1.03)
SQUAMOUS URNS QL MICRO: (no result)
UROBILINOGEN UR QL STRIP.AUTO: (no result) E.U./DL
UROBILINOGEN UR STRIP.AUTO-MCNC: NEGATIVE MG/DL

## 2020-10-31 PROCEDURE — 99283 EMERGENCY DEPT VISIT LOW MDM: CPT

## 2020-10-31 PROCEDURE — 85025 COMPLETE CBC W/AUTO DIFF WBC: CPT

## 2020-10-31 PROCEDURE — 87086 URINE CULTURE/COLONY COUNT: CPT

## 2020-10-31 PROCEDURE — 80048 BASIC METABOLIC PNL TOTAL CA: CPT

## 2020-10-31 PROCEDURE — 81001 URINALYSIS AUTO W/SCOPE: CPT

## 2020-10-31 PROCEDURE — 36415 COLL VENOUS BLD VENIPUNCTURE: CPT

## 2020-10-31 PROCEDURE — 84153 ASSAY OF PSA TOTAL: CPT

## 2020-10-31 PROCEDURE — 81003 URINALYSIS AUTO W/O SCOPE: CPT

## 2020-10-31 NOTE — ED PHYSICIAN DOCUMENTATION
PD HPI MALE 





- Stated complaint


Stated Complaint: MALE 





- Chief complaint


Chief Complaint: Abd Pain





- History obtained from


History obtained from: Patient, Family (mostly the wife)





- Additional information


Additional information: 





86-year-old gentleman with history of Parkinson's and remote prostate cancer 

treated with proton therapy.  Most of the history is from the wife.  Last night 

he had gross hematuria with small clots.  Today he urinated fairly normally but 

was complaining of dysuria.  No flank pain or fevers.





Review of Systems


Constitutional: denies: Fever, Chills


Cardiac: denies: Chest pain / pressure, Palpitations


Respiratory: denies: Dyspnea, Cough


GI: denies: Abdominal Pain, Nausea, Vomiting, Diarrhea





PD PAST MEDICAL HISTORY





- Past Medical History


Cardiovascular: Hypertension, High cholesterol, Coronary artery disease, MI


Respiratory: Asthma, COPD


Neuro: Dementia, Parkinson's


Endocrine/Autoimmune: None


GI: GERD, Colon polyps


: Incontinence, Frequency, Kidney stones, Other (prostate cancer)


HEENT: Glaucoma, Chronic sinusitis


Psych: None


Musculoskeletal: Fatigue, Chronic back pain


Derm: Psoriasis





- Past Surgical History


Past Surgical History: Yes


General: Colonoscopy


Cardiovascular: Coronary stent


HEENT: Cataracts





- Present Medications


Home Medications: 


                                Ambulatory Orders











 Medication  Instructions  Recorded  Confirmed


 


Aspirin 81 mg PO DAILY PM 12/18/13 10/02/20


 


Metoprolol Tartrate 12.5 mg PO BID 05/03/16 10/02/20


 


Albuterol Sulfate [Proair Hfa 8.5 gm IH QID PRN 08/06/20 10/02/20





Inhaler]   


 


Carbidopa/Levodopa 25/100 [Sinemet 2 each PO 0800 08/06/20 10/02/20





25 mg/100 mg]   


 


Carbidopa/Levodopa 25/100 [Sinemet 2 tab PO 1430 08/06/20 10/02/20





25 mg/100 mg]   


 


Carbidopa/Levodopa ER 50/200 1 tab PO 2100 08/06/20 10/02/20





[Sinemet Cr 50 mg/200 mg]   


 


Latanoprost 1 drops OP DAILY PM 08/06/20 10/02/20


 


Omeprazole 20 mg PO DAILY 08/06/20 10/02/20


 


Potassium Chloride [Micro-K] 10 meq PO DAILY 08/06/20 10/02/20


 


Donepezil HCl 10 mg PO DAILY 08/07/20 10/02/20


 


Fluticasone Propion/Salmeterol 1 puffs INH BID 10/02/20 10/02/20





[Wixela 250-50 Inhub]   


 


Psyllium Husk [Fiber] 1 tab PO DAILY 10/02/20 10/02/20














- Allergies


Allergies/Adverse Reactions: 


                                    Allergies











Allergy/AdvReac Type Severity Reaction Status Date / Time


 


beta blockers Allergy  Unknown Uncoded 10/31/20 12:19














- Social History


Does the pt smoke?: No


Smoking Status: Former smoker


Does the pt drink ETOH?: Yes


Does the pt have substance abuse?: No





- Immunizations


Immunizations are current?: Yes





- POLST


Patient has POLST: Yes





PD ED PE NORMAL





- Vitals


Vital signs reviewed: Yes





- General


General: No acute distress, Well developed/nourished





- Abdomen


Abdomen: Soft, Non tender





- Back


Back: No CVA TTP, No spinal TTP





- Extremities


Extremities: No edema, No calf tenderness / cord





Results





- Vitals


Vitals: 


                               Vital Signs - 24 hr











  10/31/20





  12:14


 


Temperature 36.3 C L


 


Heart Rate 65


 


Respiratory 18





Rate 


 


Blood Pressure 152/92 H


 


O2 Saturation 100








                                     Oxygen











O2 Source                      Room air

















- Labs


Labs: 


                                Laboratory Tests











  10/31/20 10/31/20 10/31/20





  12:49 12:49 12:49


 


WBC  7.4  


 


RBC  4.25 L  


 


Hgb  13.7 L  


 


Hct  42.1  


 


MCV  99.1 H  


 


MCH  32.2 H  


 


MCHC  32.5  


 


RDW  14.2  


 


Plt Count  157  


 


MPV  9.9  


 


Neut # (Auto)  5.4  


 


Lymph # (Auto)  1.0 L  


 


Mono # (Auto)  0.6  


 


Eos # (Auto)  0.3  


 


Baso # (Auto)  0.0  


 


Absolute Nucleated RBC  0.00  


 


Nucleated RBC %  0.0  


 


Sodium   141 


 


Potassium   4.0 


 


Chloride   102 


 


Carbon Dioxide   29 


 


Anion Gap   10.0 


 


BUN   25 H 


 


Creatinine   1.2 


 


Estimated GFR (MDRD)   57 L 


 


Glucose   106 H 


 


Calcium   9.0 


 


Prostate Specific Ag    0.450


 


Urine Color   


 


Urine Clarity   


 


Urine pH   


 


Ur Specific Gravity   


 


Urine Protein   


 


Urine Glucose (UA)   


 


Urine Ketones   


 


Urine Occult Blood   


 


Urine Nitrite   


 


Urine Bilirubin   


 


Urine Urobilinogen   


 


Ur Leukocyte Esterase   


 


Urine RBC   


 


Urine WBC   


 


Ur Squamous Epith Cells   


 


Urine Bacteria   


 


Ur Microscopic Review   


 


Urine Culture Comments   














  10/31/20





  13:20


 


WBC 


 


RBC 


 


Hgb 


 


Hct 


 


MCV 


 


MCH 


 


MCHC 


 


RDW 


 


Plt Count 


 


MPV 


 


Neut # (Auto) 


 


Lymph # (Auto) 


 


Mono # (Auto) 


 


Eos # (Auto) 


 


Baso # (Auto) 


 


Absolute Nucleated RBC 


 


Nucleated RBC % 


 


Sodium 


 


Potassium 


 


Chloride 


 


Carbon Dioxide 


 


Anion Gap 


 


BUN 


 


Creatinine 


 


Estimated GFR (MDRD) 


 


Glucose 


 


Calcium 


 


Prostate Specific Ag 


 


Urine Color  YELLOW


 


Urine Clarity  CLEAR


 


Urine pH  5.5


 


Ur Specific Gravity  1.025


 


Urine Protein  NEGATIVE


 


Urine Glucose (UA)  NEGATIVE


 


Urine Ketones  NEGATIVE


 


Urine Occult Blood  MODERATE H


 


Urine Nitrite  NEGATIVE


 


Urine Bilirubin  NEGATIVE


 


Urine Urobilinogen  0.2 (NORMAL)


 


Ur Leukocyte Esterase  NEGATIVE


 


Urine RBC  6-10 H


 


Urine WBC  0-3


 


Ur Squamous Epith Cells  NONE SEEN


 


Urine Bacteria  None Seen


 


Ur Microscopic Review  INDICATED


 


Urine Culture Comments  NOT INDICATED














PD MEDICAL DECISION MAKING





- ED course


ED course: 





86-year-old gentleman presents with resolved gross hematuria with small clots.  

Exam now is normal.  No symptoms of painful renal colic.  Urinalysis now just 

shows small blood without signs of infection.  Urology follow-up was advised.  

No symptoms of retention.





Departure





- Departure


Disposition: 01 Home, Self Care


Clinical Impression: 


Hematuria


Qualifiers:


 Hematuria type: gross Qualified Code(s): R31.0 - Gross hematuria





Condition: Good


Record reviewed to determine appropriate education?: Yes


Instructions:  ED Hematuria


Comments: 


Bloodwork is stable, urinalysis shows a small amount of blood but no signs of 

infection.  Drink plenty of fluids and return if worsening.  Otherwise you need 

to follow-up with urologist, closest is [facility] who operates an 

office in [place], the phone number is [phone].

## 2021-02-20 ENCOUNTER — HOSPITAL ENCOUNTER (OUTPATIENT)
Dept: HOSPITAL 76 - EMS | Age: 86
End: 2021-02-20
Payer: MEDICARE

## 2021-02-20 DIAGNOSIS — Z03.89: Primary | ICD-10-CM

## 2021-03-04 ENCOUNTER — HOSPITAL ENCOUNTER (OUTPATIENT)
Dept: HOSPITAL 76 - LAB.WCP | Age: 86
Discharge: HOME | End: 2021-03-04
Attending: INTERNAL MEDICINE
Payer: MEDICARE

## 2021-03-04 DIAGNOSIS — I10: ICD-10-CM

## 2021-03-04 DIAGNOSIS — I25.5: Primary | ICD-10-CM

## 2021-03-04 LAB
ALBUMIN DIAFP-MCNC: 4.3 G/DL (ref 3.2–5.5)
ALBUMIN/GLOB SERPL: 1.3 {RATIO} (ref 1–2.2)
ALP SERPL-CCNC: 92 IU/L (ref 42–121)
ALT SERPL W P-5'-P-CCNC: < 10 IU/L (ref 10–60)
ANION GAP SERPL CALCULATED.4IONS-SCNC: 9 MMOL/L (ref 6–13)
AST SERPL W P-5'-P-CCNC: 21 IU/L (ref 10–42)
BILIRUB BLD-MCNC: 1.4 MG/DL (ref 0.2–1)
BUN SERPL-MCNC: 31 MG/DL (ref 6–20)
CALCIUM UR-MCNC: 9.1 MG/DL (ref 8.5–10.3)
CHLORIDE SERPL-SCNC: 102 MMOL/L (ref 101–111)
CHOLEST SERPL-MCNC: 106 MG/DL
CO2 SERPL-SCNC: 26 MMOL/L (ref 21–32)
CREAT SERPLBLD-SCNC: 1.3 MG/DL (ref 0.6–1.2)
GFRSERPLBLD MDRD-ARVRAT: 52 ML/MIN/{1.73_M2} (ref 89–?)
GLOBULIN SER-MCNC: 3.2 G/DL (ref 2.1–4.2)
GLUCOSE SERPL-MCNC: 102 MG/DL (ref 70–100)
HDLC SERPL-MCNC: 47 MG/DL
HDLC SERPL: 2.3 {RATIO} (ref ?–5)
LDLC SERPL CALC-MCNC: 48 MG/DL
LDLC/HDLC SERPL: 1 {RATIO} (ref ?–3.6)
POTASSIUM SERPL-SCNC: 4.3 MMOL/L (ref 3.5–5)
PROT SPEC-MCNC: 7.5 G/DL (ref 6.7–8.2)
SODIUM SERPLBLD-SCNC: 137 MMOL/L (ref 135–145)
TRIGL P FAST SERPL-MCNC: 56 MG/DL
TSH SERPL-ACNC: 1.84 UIU/ML (ref 0.34–5.6)
VLDLC SERPL-SCNC: 11 MG/DL

## 2021-03-04 PROCEDURE — 83721 ASSAY OF BLOOD LIPOPROTEIN: CPT

## 2021-03-04 PROCEDURE — 80061 LIPID PANEL: CPT

## 2021-03-04 PROCEDURE — 80053 COMPREHEN METABOLIC PANEL: CPT

## 2021-03-04 PROCEDURE — 84443 ASSAY THYROID STIM HORMONE: CPT

## 2021-03-04 PROCEDURE — 36415 COLL VENOUS BLD VENIPUNCTURE: CPT

## 2021-06-03 ENCOUNTER — HOSPITAL ENCOUNTER (OUTPATIENT)
Dept: HOSPITAL 76 - EMS | Age: 86
End: 2021-06-03
Payer: MEDICARE

## 2021-06-03 DIAGNOSIS — Z03.89: Primary | ICD-10-CM

## 2021-06-12 ENCOUNTER — HOSPITAL ENCOUNTER (OUTPATIENT)
Dept: HOSPITAL 76 - EMS | Age: 86
End: 2021-06-12
Payer: MEDICARE
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
## 2021-06-12 DIAGNOSIS — S00.01XA: Primary | ICD-10-CM

## 2021-07-30 ENCOUNTER — HOSPITAL ENCOUNTER (OUTPATIENT)
Dept: HOSPITAL 76 - EMS | Age: 86
End: 2021-07-30
Payer: MEDICARE

## 2021-07-30 DIAGNOSIS — Z03.89: Primary | ICD-10-CM

## 2021-09-24 ENCOUNTER — HOSPITAL ENCOUNTER (OUTPATIENT)
Dept: HOSPITAL 76 - EMS | Age: 86
End: 2021-09-24
Payer: MEDICARE

## 2021-09-24 DIAGNOSIS — Z03.89: Primary | ICD-10-CM

## 2021-12-08 ENCOUNTER — HOSPITAL ENCOUNTER (OUTPATIENT)
Dept: HOSPITAL 76 - EMS | Age: 86
End: 2021-12-08
Payer: MEDICARE

## 2021-12-08 DIAGNOSIS — Z03.89: Primary | ICD-10-CM

## 2022-05-28 ENCOUNTER — HOSPITAL ENCOUNTER (OUTPATIENT)
Dept: HOSPITAL 76 - EMS | Age: 87
Discharge: ANOTHER HOSPITAL | End: 2022-05-28
Payer: MEDICARE

## 2022-05-28 DIAGNOSIS — Z74.09: ICD-10-CM

## 2022-05-28 DIAGNOSIS — R41.82: Primary | ICD-10-CM
